# Patient Record
Sex: FEMALE | Race: ASIAN | Employment: OTHER | URBAN - METROPOLITAN AREA
[De-identification: names, ages, dates, MRNs, and addresses within clinical notes are randomized per-mention and may not be internally consistent; named-entity substitution may affect disease eponyms.]

---

## 2017-02-08 ENCOUNTER — NURSING HOME VISIT (OUTPATIENT)
Dept: GERIATRICS | Facility: CLINIC | Age: 82
End: 2017-02-08

## 2017-02-08 ENCOUNTER — TRANSFERRED RECORDS (OUTPATIENT)
Dept: HEALTH INFORMATION MANAGEMENT | Facility: CLINIC | Age: 82
End: 2017-02-08

## 2017-02-08 VITALS
TEMPERATURE: 97.3 F | SYSTOLIC BLOOD PRESSURE: 133 MMHG | OXYGEN SATURATION: 92 % | BODY MASS INDEX: 19.56 KG/M2 | WEIGHT: 103.6 LBS | DIASTOLIC BLOOD PRESSURE: 68 MMHG | RESPIRATION RATE: 17 BRPM | HEART RATE: 61 BPM | HEIGHT: 61 IN

## 2017-02-08 DIAGNOSIS — I10 ESSENTIAL HYPERTENSION, BENIGN: ICD-10-CM

## 2017-02-08 DIAGNOSIS — I69.959 HEMIPLEGIA, LATE EFFECT OF CEREBROVASCULAR DISEASE (H): Primary | ICD-10-CM

## 2017-02-08 DIAGNOSIS — G40.909 SEIZURE DISORDER (H): ICD-10-CM

## 2017-02-08 DIAGNOSIS — R13.10 DYSPHAGIA, UNSPECIFIED TYPE: ICD-10-CM

## 2017-02-08 DIAGNOSIS — F01.52: ICD-10-CM

## 2017-02-08 LAB
BUN SERPL-MCNC: 21 MG/DL (ref 7–24)
CREAT SERPL-MCNC: 0.7 MG/DL (ref 0.55–1.02)
GFR SERPL CREATININE-BSD FRML MDRD: >60 ML/MIN/1.73M2
HEMOGLOBIN: 13.1 G/DL (ref 12–16)
POTASSIUM SERPL-SCNC: 4.1 MMOL/L (ref 3.5–5.1)
SODIUM SERPL-SCNC: 143 MMOL/L (ref 136–145)

## 2017-02-08 NOTE — PROGRESS NOTES
[facility]    Chief Complaint   Patient presents with     [facility] Regulatory       HPI:    Riaz Sim is a 89 year old  (3/11/1925), who is being seen today for a federally mandated E/M visit at [facility]. Today's concerns are:    Hemiplegia, late effect of cerebrovascular disease (H)  Dysphagia, unspecified type  Seizure disorder (H)  Patient with hx CVA, has hemiplegia and dysphagia. She is tube fed. Does occasionally have 100 cc plus residuals. Dietary reviewed orders and as of yesterday recommending Glucerna 1.2 clementine at 45 ml/hr continuous feeding on at 0330 off at 1830. Thus far no reports of emesis or large residuals.   WEIGHT:  1/4/17 103.4 LBS AND 2/7/17 103.6 LBS. She is off seizure medications and has had no seizure events reported    Essential hypertension, benign  BP: 130-133/68-71 and diet controlled. POTASSIUM      4.5   8/10/2016 and takes KCL daily. Will re-check K level and stop KCL if normal.     Dementia, vascular, with delusions (H)  Chronic, on ASA and scheduled/PRN Seroquel due to delusions troublesome/upsetting to patient per family report. May attempt decrease in Seroquel to 12.5 mg PO once daily and twice daily PRN if OK with family.       ALLERGIES: Review of patient's allergies indicates no known allergies.  PAST MEDICAL HISTORY:  has a past medical history of Essential hypertension, benign; CVA (cerebral infarction); Cerebral embolism with cerebral infarction (H) (7/7/2011); Nosebleeds (7/25/2011); Unspecified cerebral artery occlusion with cerebral infarction; Diabetes mellitus (H); Seizures (H); Hemiplegia (H); Dysphagia; Osteoporosis, senile; Pernicious anemia; Insomnia; Encephalopathy; G tube feedings (H); and Dementia.  PAST SURGICAL HISTORY:  has past surgical history that includes NONSPECIFIC PROCEDURE.  FAMILY HISTORY: family history is not on file.  SOCIAL HISTORY:  reports that she has never smoked. She does not have any smokeless  "tobacco history on file. She reports that she does not drink alcohol or use illicit drugs.    MEDICATIONS:  Current Outpatient Prescriptions   Medication Sig Dispense Refill     sennosides (SENOKOT) 8.6 MG tablet 2 tablets by Per G Tube route At Bedtime       potassium chloride 10 mEq in 50 mL intermittent infusion 10 mEq by Per G Tube route daily       Zinc oxide 10 % CREA Apply topically 2 times daily And PRN       polyvinyl alcohol (ARTIFICIAL TEARS) 1.4 % ophthalmic solution Place 2 drops into the right eye as needed for dry eyes       guaiFENesin (ROBITUSSIN) 100 MG/5ML SOLN 10 mLs by Per G Tube route every 4 hours as needed for cough       QUEtiapine Fumarate (SEROQUEL PO) 12.5 mg by Per G Tube route 2 times daily And every 6 hours prn       ASPIRIN PO 81 mg by Per G Tube route daily       cyanocobalamin 1000 MCG/ML injection Inject 1 mL as directed every 30 days.       acetaminophen (TYLENOL) 160 MG/5ML oral liquid 650 mg by Per G Tube route every 4 hours as needed. Give 20.31 ml (650mg)        SALINE NASAL SPRAY NA [place] 2 sprays into both nostrils as needed        Medications reviewed:  Medications reconciled to facility chart and changes were made to reflect current medications as identified as above med list. Below are the changes that were made:   Medications stopped since last EPIC medication reconciliation:   There are no discontinued medications.    Medications started since last [facility] medication reconciliation:  No orders of the defined types were placed in this encounter.     Case Management:  I have reviewed the care plan and MDS and do agree with the plan. Patient's desire to return to the community is not assessible due to cognitive impairment.  Information reviewed:  Medications, vital signs, orders, and nursing notes.    ROS:  Unobtainable secondary to cognitive impairment or aphasia.    Exam:  /68 mmHg  Pulse 61  Temp(Src) 97.3  F (36.3  C)  Resp 17  Ht 5' 0.5\" (1.537 m)  Wt 103 lb " 9.6 oz (46.993 kg)  BMI 19.89 kg/m2  SpO2 92%  GENERAL APPEARANCE:  in no distress, cooperative. Laying comfortably in bed. Sleepy.   EYES:  Symmetric, no drainage. Sclera white and conjunctiva clear. PERRL.   RESP:  Respirations non-labored, no respiratory distress. Lungs clear to auscultation bilaterally. On room air  CV:  regular rate and rhythm, no murmur, rub, or gallop, no edema to bilateral LE. No open areas on extremities.   ABDOMEN:  bowel sounds present x4. Abdomen soft, nontender, no guarding or rebound. No distension, G-tube in place.   M/S:   Very limited ROM to LUE. ROM to RUE at baseline. LLE ROM limited. RLE ROM at baseline.   NEURO:   Patient alert and calm. L-sided hemiplegia at baseline. Examination of sensation by touch normal to extremities.   PSYCH:  Appeared cooperative; calm.     Lab/Diagnostic data:    CBC RESULTS:   Recent Labs   Lab Test 08/03/16 02/03/16  07/18/14  10/25/12   0745   WBC   --    --    --   5.1  6.3   RBC   --    --    --   4.19  4.16   HGB  12.3  12.6   < >  13.3  13.3   HCT   --    --    --   40.5  39.6   MCV   --    --    --   97  95   MCH   --    --    --   32  32.0   MCHC   --    --    --   33  33.6   RDW   --    --    --   12.6  12.7   PLT   --    --    --   237  205    < > = values in this interval not displayed.       Last Basic Metabolic Panel:  Recent Labs   Lab Test 08/10/16 08/03/16 02/03/16  07/18/14  10/25/12   0745  06/10/11   0915   NA   --   140  141   < >  143  143  140   POTASSIUM  4.5  3.4*  4.2   < >  3.5  4.2  3.3*   CHLORIDE   --    --    --    --   107  104  103   CAROLYN   --    --    --    --   9.1  9.3  8.5   CO2   --    --    --    --    --   25  25   BUN   --   22  21   < >  22  23  14   CR   --   0.76  0.75   < >  0.74  0.68  0.66   GLC   --    --    --    --   87  97  96    < > = values in this interval not displayed.       ASSESSMENT/PLAN  Hemiplegia, late effect of cerebrovascular disease (H)  Chronic. Monitor. Appropriate for LTC. Staff to  anticipate needs.     Dysphagia, unspecified type  Chronic. TF orders as above. Monitor.     Seizure disorder (H)  By history. Now off meds - no recent seizure episodes. Monitor.     Essential hypertension, benign  Chronic, diet controlled. Stop KCL - f/u with K level in two weeks. VS per facility routine.     Dementia, vascular, with delusions (H)  If OK with family will taper Seroquel scheduled dose.     Orders:  1. Stop KCL. Check K level in two weeks diagnosis hypokalemia  2. If OK with family, decrease Seroquel to 12.5 mg PO HS and BID PRN delusions, agitation. Staff to update provider if not effective.     Total time spent with patient visit was 25 min including patient visit and review of past records.Greater than 50% of total time spent with counseling and coordinating care.    Electronically signed by:  RADHA Marquez CNP

## 2017-02-22 ENCOUNTER — TRANSFERRED RECORDS (OUTPATIENT)
Dept: HEALTH INFORMATION MANAGEMENT | Facility: CLINIC | Age: 82
End: 2017-02-22

## 2017-02-22 LAB — POTASSIUM SERPL-SCNC: 3.8 MMOL/L (ref 3.5–5.1)

## 2017-04-17 VITALS
OXYGEN SATURATION: 98 % | RESPIRATION RATE: 16 BRPM | DIASTOLIC BLOOD PRESSURE: 66 MMHG | WEIGHT: 98 LBS | SYSTOLIC BLOOD PRESSURE: 130 MMHG | HEART RATE: 66 BPM | HEIGHT: 61 IN | BODY MASS INDEX: 18.5 KG/M2 | TEMPERATURE: 97.5 F

## 2017-04-18 ENCOUNTER — NURSING HOME VISIT (OUTPATIENT)
Dept: GERIATRICS | Facility: CLINIC | Age: 82
End: 2017-04-18
Payer: COMMERCIAL

## 2017-04-18 DIAGNOSIS — I69.959 HEMIPLEGIA, LATE EFFECT OF CEREBROVASCULAR DISEASE (H): ICD-10-CM

## 2017-04-18 DIAGNOSIS — F01.52: ICD-10-CM

## 2017-04-18 DIAGNOSIS — R13.10 DYSPHAGIA, UNSPECIFIED TYPE: ICD-10-CM

## 2017-04-18 DIAGNOSIS — K59.01 SLOW TRANSIT CONSTIPATION: Primary | ICD-10-CM

## 2017-04-18 PROCEDURE — 99309 SBSQ NF CARE MODERATE MDM 30: CPT | Performed by: INTERNAL MEDICINE

## 2017-04-18 PROCEDURE — 99207 ZZC CDG-CORRECTLY CODED, REVIEWED AND AGREE: CPT | Performed by: INTERNAL MEDICINE

## 2017-04-18 NOTE — PROGRESS NOTES
[facility]  Nursing Home Regulatory Visit  April 18, 2017    Chief Complaint   Patient presents with     [facility] Regulatory       HPI:    Riaz Sim is a 89 year old  (3/11/1925), who is being seen today for a federally mandated E/M visit at [facility]. Today's concerns are:    1) CVA w/ Dysphagia & Hemiplegia -- is maintained on tube feeds. Weights stable. Nutrition follows. Is on ASA 81 mg daily.   2) Vascular Dementia with Delusions -- Chronic. Progressive. Requires 24/7 cares. Delusions managed with quetiapine. She has had a dose reduction since I last saw her in December, now on 12.5 mg daily and q12h PRN (from 12.5 mg BID and q6h PRN).   3) Slow Transit Constipation -- managed with Senna 2 tabs qhs    ALLERGIES: Review of patient's allergies indicates no known allergies.    PAST MEDICAL HISTORY:   Past Medical History:   Diagnosis Date     Cerebral embolism with cerebral infarction (H) 7/7/2011     CVA (cerebral infarction)     R side     Dementia      Diabetes mellitus (H)      Dysphagia      Encephalopathy      Essential hypertension, benign      G tube feedings (H)      Hemiplegia (H)     with left sided neglect     Insomnia      Nosebleeds 7/25/2011     Osteoporosis, senile      Pernicious anemia      Seizures (H)      Unspecified cerebral artery occlusion with cerebral infarction        PAST SURGICAL HISTORY:   Past Surgical History:   Procedure Laterality Date     C NONSPECIFIC PROCEDURE      left wrist surgery       FAMILY HISTORY:   No family history on file.    SOCIAL HISTORY:   Lives in a SNF    MEDICATIONS:  Current Outpatient Prescriptions   Medication Sig Dispense Refill     sennosides (SENOKOT) 8.6 MG tablet 2 tablets by Per G Tube route At Bedtime       Zinc oxide 10 % CREA Apply topically 2 times daily And PRN       polyvinyl alcohol (ARTIFICIAL TEARS) 1.4 % ophthalmic solution Place 2 drops into the right eye as needed for dry eyes       guaiFENesin  "(ROBITUSSIN) 100 MG/5ML SOLN 10 mLs by Per G Tube route every 4 hours as needed for cough       QUEtiapine Fumarate (SEROQUEL PO) 12.5 mg by Per G Tube route daily And every 12 hours prn       ASPIRIN PO 81 mg by Per G Tube route daily       cyanocobalamin 1000 MCG/ML injection Inject 1 mL as directed every 30 days.       acetaminophen (TYLENOL) 160 MG/5ML oral liquid 650 mg by Per G Tube route every 4 hours as needed. Give 20.31 ml (650mg)        SALINE NASAL SPRAY NA [place] 2 sprays into both nostrils as needed          Medications reviewed:  Medications reconciled to facility chart and changes were made to reflect current medications as identified as above med list. Below are the changes that were made:   Medications stopped since last EPIC medication reconciliation:   There are no discontinued medications.  Medications started since last [facility] medication reconciliation:  No orders of the defined types were placed in this encounter.    Case Management:  I have reviewed the care plan and MDS and do agree with the plan.   Information reviewed:  Medications, vital signs, orders, and nursing notes.    ROS:  Unable to be obtained due to cognitive impairment or aphasia.     PHYSICAL EXAM:  /66  Pulse 66  Temp 97.5  F (36.4  C)  Resp 16  Ht 5' 0.5\" (1.537 m)  Wt 98 lb (44.5 kg)  SpO2 98%  BMI 18.82 kg/m2  Gen: laying in bed in no acute distress  Resp: breathing non-labored  GI: abdomen soft  Ext: no LE edema  Psych: memory, judgement and insight impaired    Lab/Diagnostic data:  Reviewed as per Epic    ASSESSMENT/PLAN    1) CVA w/ Dysphagia & Hemiplegia   Is maintained on tube feeds. Weights stable. Nutrition follows.   -- continues on ASA 81 mg daily    2) Vascular Dementia with Delusions   Chronic. Progressive. Delusions managed with quetiapine. She has had a dose reduction since I last saw her in December, now on 12.5 mg daily and q12h PRN (from 12.5 mg BID and q6h PRN).   -- continues on quetiapine 12.5 " mg daily and q12h PRN  -- ongoing 24/7 nursing and supportive cares    3) Slow Transit Constipation   -- managed with Senna 2 tabs qhs  -- adjust bowel regimen as needed    Riaz Sim is stable. No concerns per nursing. No changes to plan of care today.    Electronically signed by:  Candice Baron MD

## 2017-05-15 ENCOUNTER — NURSING HOME VISIT (OUTPATIENT)
Dept: GERIATRICS | Facility: CLINIC | Age: 82
End: 2017-05-15
Payer: COMMERCIAL

## 2017-05-15 VITALS
OXYGEN SATURATION: 97 % | HEIGHT: 61 IN | TEMPERATURE: 97 F | SYSTOLIC BLOOD PRESSURE: 121 MMHG | BODY MASS INDEX: 18.81 KG/M2 | WEIGHT: 99.6 LBS | RESPIRATION RATE: 16 BRPM | HEART RATE: 61 BPM | DIASTOLIC BLOOD PRESSURE: 68 MMHG

## 2017-05-15 DIAGNOSIS — I69.959 HEMIPLEGIA, LATE EFFECT OF CEREBROVASCULAR DISEASE (H): ICD-10-CM

## 2017-05-15 DIAGNOSIS — Z00.00 VISIT FOR ANNUAL HEALTH EXAMINATION: Primary | ICD-10-CM

## 2017-05-15 DIAGNOSIS — G47.00 INSOMNIA, UNSPECIFIED TYPE: ICD-10-CM

## 2017-05-15 DIAGNOSIS — K59.01 SLOW TRANSIT CONSTIPATION: ICD-10-CM

## 2017-05-15 DIAGNOSIS — I10 ESSENTIAL HYPERTENSION, BENIGN: ICD-10-CM

## 2017-05-15 DIAGNOSIS — R13.10 DYSPHAGIA, UNSPECIFIED TYPE: ICD-10-CM

## 2017-05-15 DIAGNOSIS — F01.52: ICD-10-CM

## 2017-05-15 DIAGNOSIS — D51.0 PA (PERNICIOUS ANEMIA): ICD-10-CM

## 2017-05-15 DIAGNOSIS — G40.909 SEIZURE DISORDER (H): ICD-10-CM

## 2017-05-15 DIAGNOSIS — Z71.89 ADVANCED DIRECTIVES, COUNSELING/DISCUSSION: Chronic | ICD-10-CM

## 2017-05-15 PROCEDURE — 99207 ZZC CDG-CORRECTLY CODED, REVIEWED AND AGREE: CPT | Performed by: NURSE PRACTITIONER

## 2017-05-15 PROCEDURE — 99318 ZZC ANNUAL NURSING FAC ASSESSMNT, STABLE: CPT | Performed by: NURSE PRACTITIONER

## 2017-05-15 NOTE — PROGRESS NOTES
[facility]  Chief Complaint   Patient presents with     Annual Comprehensive Nursing Home       HPI:      Riaz Sim is a 89 year old  (3/11/1925), who is being seen today for an annual comprehensive visit at [facility] . Today's concerns are:    Visit for annual health examination   Completed today, 5/15/17.    Hemiplegia, late effect of cerebrovascular disease (H)  Nonverbal today but following all commands. Per staff, she speaks in her language frequently, other times, very calm without speaking. Left sided deficits. She is dependant with all cares. Uses a lift for transfers and wheelchair for mobility.     Dysphagia, unspecified type  She is NPO and receives cyclic tube feeds [id number], with 150 ml QID water flushes. Residuals of 50 ml. No nausea or emesis. WEIGHT: 4/19/17 100.2 LBS AND 5/3/17 99.6 LBS    Seizure disorder (H)  By history with no reports of seizures. Currently does not take antieleptic medications.    Dementia, vascular, with delusions (H)  Insomnia, unspecified type  No behaviors noted by staff. She will have 1-2 nights of difficulty sleeping but generally insomnia has been well controlled. Continues on Quetiapine 12.5 mg daily and q12hrs PRN.    Essential hypertension, benign  By history. Based on JNC-8 goals,  patients age of 89 year old, no presence of diabetes or CKD, and goals of care goal BP is <150/90 mm Hg. patient is stable and continue without pharmacological invention with routine assessment.  BP: 114-130/65-72  P: 61-81  O2: 94-97 on room air.    Pernicious Anemia  Receives a monthly B12 injection.   Hemoglobin   Date Value Ref Range Status   02/08/2017 13.1 12.0 - 16.0 g/dL Final       Slow transit constipation  Regular bowel movement with 2 tablets senokot at HS. No complaints of abdominal pain, discomfort or bloating.     Advance Care Planning  Goal of care is comfort. POLST form reviewed with family.     Depression screen cannot be completed given the  above diagnoses. Patient is stable without any signs of depression and no futher interventions warrented at this time.    PHQ-9 SCORE 5/15/2017   Total Score 0         ALLERGIES: Review of patient's allergies indicates no known allergies.  PROBLEM LIST:  Patient Active Problem List   Diagnosis     Essential hypertension, benign     Hemiplegia, late effect of cerebrovascular disease (H)     Dysphagia     Insomnia     Senile osteoporosis     Pernicious anemia     Encounter for counseling     Dementia, vascular, with delusions (H)     Advance Care Planning     Slow transit constipation, attempts to dig our BM, better with supps     Visit for annual health examination 6/3/15     Seizure disorder (H)     PAST MEDICAL HISTORY:  has a past medical history of Cerebral embolism with cerebral infarction (H) (7/7/2011); CVA (cerebral infarction); Dementia; Diabetes mellitus (H); Dysphagia; Encephalopathy; Essential hypertension, benign; G tube feedings (H); Hemiplegia (H); Insomnia; Nosebleeds (7/25/2011); Osteoporosis, senile; Pernicious anemia; Seizures (H); and Unspecified cerebral artery occlusion with cerebral infarction.  PAST SURGICAL HISTORY:  has a past surgical history that includes NONSPECIFIC PROCEDURE.  FAMILY HISTORY: family history is not on file.  SOCIAL HISTORY:  reports that she has never smoked. She does not have any smokeless tobacco history on file. She reports that she does not drink alcohol or use illicit drugs.  IMMUNIZATIONS:  Most Recent Immunizations   Administered Date(s) Administered     Influenza (High Dose) 3 valent vaccine 10/07/2016     Influenza (IIV3) 11/05/2015     Pneumococcal (PCV 13) 11/10/2015     Pneumococcal 23 valent 06/09/2011     Tdap (Adacel,Boostrix) 06/27/2014     Above immunizations pulled from [facility]. [facility] and facility records also reconciled. Outstanding information sent to  to update [facility].  Future immunizations are not needed at this point as  all recommended immunizations are up to date.   MEDICATIONS:  Current Outpatient Prescriptions   Medication Sig Dispense Refill     sennosides (SENOKOT) 8.6 MG tablet 2 tablets by Per G Tube route At Bedtime       Zinc oxide 10 % CREA Apply topically 2 times daily And PRN       polyvinyl alcohol (ARTIFICIAL TEARS) 1.4 % ophthalmic solution Place 2 drops into the right eye as needed for dry eyes       guaiFENesin (ROBITUSSIN) 100 MG/5ML SOLN 10 mLs by Per G Tube route every 4 hours as needed for cough       QUEtiapine Fumarate (SEROQUEL PO) 12.5 mg by Per G Tube route daily And every 12 hours prn       ASPIRIN PO 81 mg by Per G Tube route daily       cyanocobalamin 1000 MCG/ML injection Inject 1 mL as directed every 30 days.       acetaminophen (TYLENOL) 160 MG/5ML oral liquid 650 mg by Per G Tube route every 4 hours as needed. Give 20.31 ml (650mg)        SALINE NASAL SPRAY NA [place] 2 sprays into both nostrils as needed        Medications reviewed:  Medications reconciled to facility chart and changes were made to reflect current medications as identified as above med list. Below are the changes that were made:   Medications stopped since last EPIC medication reconciliation:   There are no discontinued medications.    Medications started since last [facility] medication reconciliation:  No orders of the defined types were placed in this encounter.      Case Management:  I have reviewed the facility/SNF care plan/MDS which was done 4/18/17, including the falls risk, nutrition and pain screening. I also reviewed the current immunizations, and preventive care..Future cancer screening is not clinically indicated secondary to age/goals of care Patient's desire to return to the community is not assessible due to cognitive impairment. Current Level of Care is appropriate.    Advance Directive Discussion:    I reviewed the current advanced directives as reflected in EPIC, the POLST and the facility chart, and verified the  "congruency of orders. I contacted the first party , Jacqueline, and left a message regarding the plan of Care.  I did not due to cognitive impairment review the advance directives with the resident.     Team Discussion:  I communicated with the appropriate disciplines involved with the Plan of Care:   Nursing    Patient Goal:  Patient's/family goal is pain control and comfort.    Information reviewed:  Medications, vital signs, orders, and nursing notes.    ROS:  Unobtainable secondary to cognitive impairment or aphasia, but today pt nods \"no\" to pain, shortness of breath or chest pain.     Exam:  /68  Pulse 61  Temp 97  F (36.1  C)  Resp 16  Ht 5' 0.5\" (1.537 m)  Wt 99 lb 9.6 oz (45.2 kg)  SpO2 97%  BMI 19.13 kg/m2   GENERAL APPEARANCE: in no distress, cooperative. Laying comfortably in bed. Eyes closed.   EYES: Symmetric, no drainage.    NECK: Supple, trachea midline. No asymmetry  RESP: Respirations non-labored, no respiratory distress. Lungs clear to auscultation bilaterally.   CV: regular rate and rhythm, no murmur, rub, or gallop, no edema to bilateral LE, +2 pedal pulses  ABDOMEN: bowel sounds present x4. Abdomen soft and nontender, no guarding or rebound. G-tube in place. Site free of s/s of infection.   M/S: Very limited ROM to LUE, able to squeeze with bilateral hands (weaker  on L).  LLE ROM limited and strength to gravity only. RLE and RUE range of motion present.   SKIN: Inspection of skin and subcutaneous tissue: intact, no rashes identified.  NEURO: No facial asymmetry. L-sided hemiplegia at baseline. Examination of sensation by touch normal to extremities.   PSYCH:calm and cooperative. Insight and judgement impaired, memory impaired.       Lab/Diagnostic data:      Hemoglobin   Date Value Ref Range Status   02/08/2017 13.1 12.0 - 16.0 g/dL Final       Last Basic Metabolic Panel:  Recent Labs   Lab Test 02/22/17 02/08/17  08/03/16  07/18/14  10/25/12   0745  06/10/11   0915   NA   --   " 143   --   140   < >  143  143  140   POTASSIUM  3.8  4.1   < >  3.4*   < >  3.5  4.2  3.3*   CHLORIDE   --    --    --    --    --   107  104  103   CAROLYN   --    --    --    --    --   9.1  9.3  8.5   CO2   --    --    --    --    --    --   25  25   BUN   --   21   --   22   < >  22  23  14   CR   --   0.70   --   0.76   < >  0.74  0.68  0.66   GLC   --    --    --    --    --   87  97  96    < > = values in this interval not displayed.       ASSESSMENT/PLAN  (Z00.00) Visit for annual health examination 5/15/2017  (primary encounter diagnosis)    (I69.959) Hemiplegia, late effect of cerebrovascular disease (H)  (R13.10) Dysphagia, unspecified type  Comment: Chronic secondary to CVA. Left sided hemiplegia.  Plan: continue with cyclic tube feeds. 24/7 nursing cares    (G40.909) Seizure disorder (H)  Comment: by history-no further seizure activity   Plan: continue to monitor    (F01.51,  F22) Dementia, vascular, with delusions (H)  Comment: chronic, progressive. Weight stable.   Plan: 24/7 nursing care.    (G47.00) Insomnia, unspecified type  Comment: chronic, controlled secondary to dementia.  Plan: continue seroquel. Monitor.    (I10) Essential hypertension, benign  Comment: chronic, controlled. No medications as this time.  Plan: continue to monitor blood pressure.    (D51.0) PA (pernicious anemia)  Comment: chronic, hgb stable.   Plan: continue B12 injections and monitor level annually.    (K59.01) Slow transit constipation  Comment: chronic, controlled.   Plan: continue with senokot at HS.     (Z71.89) Advance Care Planning  Comment: comfort care.  .  Electronically signed by:  RADHA Navarro CNP

## 2017-05-16 ASSESSMENT — PATIENT HEALTH QUESTIONNAIRE - PHQ9: SUM OF ALL RESPONSES TO PHQ QUESTIONS 1-9: 0

## 2017-06-27 VITALS
SYSTOLIC BLOOD PRESSURE: 135 MMHG | RESPIRATION RATE: 16 BRPM | TEMPERATURE: 97 F | DIASTOLIC BLOOD PRESSURE: 76 MMHG | HEART RATE: 64 BPM | OXYGEN SATURATION: 97 %

## 2017-06-28 ENCOUNTER — NURSING HOME VISIT (OUTPATIENT)
Dept: GERIATRICS | Facility: CLINIC | Age: 82
End: 2017-06-28
Payer: COMMERCIAL

## 2017-06-28 DIAGNOSIS — I10 ESSENTIAL HYPERTENSION, BENIGN: ICD-10-CM

## 2017-06-28 DIAGNOSIS — F01.52: ICD-10-CM

## 2017-06-28 DIAGNOSIS — G40.909 SEIZURE DISORDER (H): Primary | ICD-10-CM

## 2017-06-28 DIAGNOSIS — K59.01 SLOW TRANSIT CONSTIPATION: ICD-10-CM

## 2017-06-28 DIAGNOSIS — I69.959 HEMIPLEGIA, LATE EFFECT OF CEREBROVASCULAR DISEASE (H): ICD-10-CM

## 2017-06-28 DIAGNOSIS — R13.10 DYSPHAGIA, UNSPECIFIED TYPE: ICD-10-CM

## 2017-06-28 PROCEDURE — 99309 SBSQ NF CARE MODERATE MDM 30: CPT | Performed by: NURSE PRACTITIONER

## 2017-06-28 NOTE — PROGRESS NOTES
[facility]    Chief Complaint   Patient presents with     alf Regulatory       HPI:    Riaz Sim is a 89 year old  (3/11/1925), who is being seen today for a federally mandated E/M visit at [facility] .  HPI information obtained from: facility chart records, facility staff and [facility] Epic chart review.   Today's concerns are:    Seizure disorder (H)  Patient without any recent noted seizure events. She is now off seizure meds. Monitored by staff.     Hemiplegia, late effect of cerebrovascular disease (H)  Dementia, vascular, with delusions (H)  Dysphagia, unspecified type  Patient with significant dementia. She only speaks cantonese and per previous family report she is confused and does not benefit from . Has been on scheduled low dose Seroquel at PM due to family request as she often is delusional and reports seeing ghosts. Decreased dose several months ago without issues.     Slow transit constipation, attempts to dig our BM, better with supps  Managed well with Senna, no changes needed.     Essential hypertension, benign  Chronic. Recent Weight: 05/17: 98.4 lbs - 06/14: 99.8 lbs and BP: 124-135/64-76 mmHg. Diet controlled, off meds.       ALLERGIES: Review of patient's allergies indicates no known allergies.  PAST MEDICAL HISTORY:  has a past medical history of Cerebral embolism with cerebral infarction (H) (7/7/2011); CVA (cerebral infarction); Dementia; Diabetes mellitus (H); Dysphagia; Encephalopathy; Essential hypertension, benign; G tube feedings (H); Hemiplegia (H); Insomnia; Nosebleeds (7/25/2011); Osteoporosis, senile; Pernicious anemia; Seizures (H); and Unspecified cerebral artery occlusion with cerebral infarction.  PAST SURGICAL HISTORY:  has a past surgical history that includes NONSPECIFIC PROCEDURE.  FAMILY HISTORY: family history is not on file.  SOCIAL HISTORY:  reports that she has never smoked. She does not have any smokeless tobacco history  on file. She reports that she does not drink alcohol or use illicit drugs.    MEDICATIONS:  Current Outpatient Prescriptions   Medication Sig Dispense Refill     sennosides (SENOKOT) 8.6 MG tablet 2 tablets by Per G Tube route At Bedtime       Zinc oxide 10 % CREA Apply topically 2 times daily And PRN       polyvinyl alcohol (ARTIFICIAL TEARS) 1.4 % ophthalmic solution Place 2 drops into the right eye as needed for dry eyes       guaiFENesin (ROBITUSSIN) 100 MG/5ML SOLN 10 mLs by Per G Tube route every 4 hours as needed for cough       QUEtiapine Fumarate (SEROQUEL PO) 12.5 mg by Per G Tube route daily And once daily prn       ASPIRIN PO 81 mg by Per G Tube route daily       cyanocobalamin 1000 MCG/ML injection Inject 1 mL as directed every 30 days.       acetaminophen (TYLENOL) 160 MG/5ML oral liquid 650 mg by Per G Tube route every 4 hours as needed. Give 20.31 ml (650mg)        SALINE NASAL SPRAY NA [place] 2 sprays into both nostrils as needed        Medications reviewed:  Medications reconciled to facility chart and changes were made to reflect current medications as identified as above med list. Below are the changes that were made:   Medications stopped since last EPIC medication reconciliation:   There are no discontinued medications.    Medications started since last [facility] medication reconciliation:  No orders of the defined types were placed in this encounter.    Case Management:  I have reviewed the care plan and MDS and do agree with the plan. Patient's desire to return to the community is not assessible due to cognitive impairment.  Information reviewed:  Medications, vital signs, orders, and nursing notes.    ROS:  Unobtainable secondary to cognitive impairment or aphasia.    Exam:  Vitals: /76  Pulse 64  Temp 97  F (36.1  C)  Resp 16  SpO2 97%  BMI= There is no height or weight on file to calculate BMI.  GENERAL APPEARANCE:  in no distress, cooperative. Laying comfortably in bed. Allows exam.    EYES:  Symmetric, no drainage. Sclera white and conjunctiva clear. PERRL.   RESP:  Respirations non-labored, no respiratory distress. Lungs clear to auscultation bilaterally. On room air.   CV:  regular rate and rhythm, no murmur, rub, or gallop, no edema to bilateral LE. No open areas on extremities.   ABDOMEN:  bowel sounds present x4. Abdomen soft, nontender, no guarding or rebound. No distension, G-tube in place.   M/S:   Very limited ROM to LUE and LLE. ROM to RUE and RLE at baseline.  NEURO:   Patient alert and calm. L-sided hemiplegia at baseline. Examination of sensation by touch appears normal.  PSYCH:  Appeared cooperative; calm.     Lab/Diagnostic data:    CBC RESULTS:   Recent Labs   Lab Test 02/08/17 08/03/16  07/18/14  10/25/12   0745   WBC   --    --    --   5.1  6.3   RBC   --    --    --   4.19  4.16   HGB  13.1  12.3   < >  13.3  13.3   HCT   --    --    --   40.5  39.6   MCV   --    --    --   97  95   MCH   --    --    --   32  32.0   MCHC   --    --    --   33  33.6   RDW   --    --    --   12.6  12.7   PLT   --    --    --   237  205    < > = values in this interval not displayed.       Last Basic Metabolic Panel:  Recent Labs   Lab Test 02/22/17 02/08/17  08/03/16  07/18/14  10/25/12   0745  06/10/11   0915   NA   --   143   --   140   < >  143  143  140   POTASSIUM  3.8  4.1   < >  3.4*   < >  3.5  4.2  3.3*   CHLORIDE   --    --    --    --    --   107  104  103   CAROLYN   --    --    --    --    --   9.1  9.3  8.5   CO2   --    --    --    --    --    --   25  25   BUN   --   21   --   22   < >  22  23  14   CR   --   0.70   --   0.76   < >  0.74  0.68  0.66   GLC   --    --    --    --    --   87  97  96    < > = values in this interval not displayed.       ASSESSMENT/PLAN    ICD-10-CM    1. Seizure disorder (H) G40.909 By history. No recent events.   2. Hemiplegia, late effect of cerebrovascular disease (H) I69.959 Chronic. Requires total care.   3. Dementia, vascular, with delusions (H)  F01.51 Chronic, has done well since most recent Seroquel dose.    F22    4. Dysphagia, unspecified type R13.10 Chronic. Tube fed. No issues.    5. Slow transit constipation, attempts to dig our BM, better with supps K59.01 Chronic well managed with Senna. Continue same.   6. Essential hypertension, benign I10 Diet control. Stable. VS per routine.      Orders:  Decrease Seroquel to 12.5 mg per GT every PM and daily PRN.     Total time spent with patient visit was 25 min including patient visit, review of past records and discussion of patient status and POC with staff. Greater than 50% of total time spent with counseling and coordinating care due to health issues as above.      Electronically signed by:  RADHA Marquez CNP

## 2017-06-28 NOTE — MR AVS SNAPSHOT
"              After Visit Summary   6/28/2017    Riaz Sim    MRN: [id number]           Patient Information     Date Of Birth          3/11/1925        Visit Information        Provider Department      6/28/2017 12:30 PM Kyung Lr APRN CNP [facility]        Today's Diagnoses     Seizure disorder (H)    -  1    Hemiplegia, late effect of cerebrovascular disease (H)        Dementia, vascular, with delusions (H)        Dysphagia, unspecified type        Slow transit constipation, attempts to dig our BM, better with supps        Essential hypertension, benign           Follow-ups after your visit        Who to contact     If you have questions or need follow up information about today's clinic visit or your schedule please contact [facility] directly at [phone].  Normal or non-critical lab and imaging results will be communicated to you by [email or web address], letter or phone within 4 business days after the clinic has received the results. If you do not hear from us within 7 days, please contact the clinic through [email or web address] or phone. If you have a critical or abnormal lab result, we will notify you by phone as soon as possible.  Submit refill requests through Triventus or call your pharmacy and they will forward the refill request to us. Please allow 3 business days for your refill to be completed.          Additional Information About Your Visit        [email or web address] Information     T[employer]entus lets you send messages to your doctor, view your test results, renew your prescriptions, schedule appointments and more. To sign up, go to [email or web address] . Click on \"Log in\" on the left side of the screen, which will take you to the Welcome page. Then click on \"Sign up Now\" on the right side of the page.     You will be asked to enter the access code listed below, as well as some personal information. Please follow the directions to create your username and password.     Your access code is: " [email or web address]  Expires: 2017  1:02 PM     Your access code will  in 90 days. If you need help or a new code, please call your [facility] clinic or [phone].        Care EveryWhere ID     This is your Care EveryWhere ID. This could be used by other organizations to access your [facility] medical records  [id number]        Your Vitals Were     Pulse Temperature Respirations Pulse Oximetry          64 97  F (36.1  C) 16 97%         Blood Pressure from Last 3 Encounters:   17 135/76   05/15/17 121/68   17 130/66    Weight from Last 3 Encounters:   05/15/17 99 lb 9.6 oz (45.2 kg)   17 98 lb (44.5 kg)   17 103 lb 9.6 oz (47 kg)              Today, you had the following     No orders found for display       Primary Care Provider Office Phone # Fax #    Eileen Zhao Stefania, APRN [phone]       [facility] [address] [facility] [address]  [facility] [address]        Equal Access to Services     [facility] HospitalBL[facility] : Hadii aad ku hadasho Soomaali, waaxda luqadaha, qaybta kaalmada adeegyada, marlin alvarado haytatan joycelyn staton . So [facility] [phone].    ATENCIÓN: Si habla español, tiene a sadler disposición servicios gratuitos de asistencia lingüística. Llame al [phone].    We comply with applicable federal civil rights laws and [place] laws. We do not discriminate on the basis of race, color, national origin, age, disability sex, sexual orientation or gender identity.            Thank you!     Thank you for choosing [facility]  for your care. Our goal is always to provide you with excellent care. Hearing back from our patients is one way we can continue to improve our services. Please take a few minutes to complete the written survey that you may receive in the mail after your visit with us. Thank you!             Your Updated Medication List - Protect others around you: Learn how to safely use, store and throw away your medicines at  www.disposemymeds.org.          This list is accurate as of: 6/28/17  1:02 PM.  Always use your most recent med list.                   Brand Name Dispense Instructions for use Diagnosis    acetaminophen 32 mg/mL solution    TYLENOL     650 mg by Per G Tube route every 4 hours as needed. Give 20.31 ml (650mg)        ARTIFICIAL TEARS 1.4 % ophthalmic solution   Generic drug:  polyvinyl alcohol      Place 2 drops into the right eye as needed for dry eyes        ASPIRIN PO      81 mg by Per G Tube route daily        cyanocobalamin 1000 MCG/ML injection    VITAMIN B12     Inject 1 mL as directed every 30 days.        guaiFENesin 20 mg/mL Soln solution    ROBITUSSIN     10 mLs by Per G Tube route every 4 hours as needed for cough        SALINE NASAL SPRAY NA      [place] 2 sprays into both nostrils as needed        sennosides 8.6 MG tablet    SENOKOT     2 tablets by Per G Tube route At Bedtime        SEROQUEL PO      12.5 mg by Per G Tube route daily And once daily prn        Zinc oxide 10 % Crea      Apply topically 2 times daily And PRN

## 2017-08-02 ENCOUNTER — TRANSFERRED RECORDS (OUTPATIENT)
Dept: HEALTH INFORMATION MANAGEMENT | Facility: CLINIC | Age: 82
End: 2017-08-02

## 2017-08-02 LAB
BUN SERPL-MCNC: 16 MG/DL (ref 7–24)
CREAT SERPL-MCNC: 0.6 MG/DL (ref 0.55–1.02)
GFR SERPL CREATININE-BSD FRML MDRD: >60 ML/MIN/1.73M2
HEMOGLOBIN: 13 G/DL (ref 12–16)
POTASSIUM SERPL-SCNC: 3.7 MMOL/L (ref 3.5–5.1)
SODIUM SERPL-SCNC: 141 MMOL/L (ref 136–145)

## 2017-08-07 VITALS
DIASTOLIC BLOOD PRESSURE: 76 MMHG | SYSTOLIC BLOOD PRESSURE: 135 MMHG | WEIGHT: 99.8 LBS | OXYGEN SATURATION: 97 % | HEART RATE: 64 BPM | RESPIRATION RATE: 16 BRPM | TEMPERATURE: 97 F | BODY MASS INDEX: 19.17 KG/M2

## 2017-08-08 ENCOUNTER — NURSING HOME VISIT (OUTPATIENT)
Dept: GERIATRICS | Facility: CLINIC | Age: 82
End: 2017-08-08
Payer: COMMERCIAL

## 2017-08-08 DIAGNOSIS — F01.52: Primary | ICD-10-CM

## 2017-08-08 DIAGNOSIS — K59.01 SLOW TRANSIT CONSTIPATION: ICD-10-CM

## 2017-08-08 DIAGNOSIS — R13.10 DYSPHAGIA, UNSPECIFIED TYPE: ICD-10-CM

## 2017-08-08 PROCEDURE — 99309 SBSQ NF CARE MODERATE MDM 30: CPT | Performed by: INTERNAL MEDICINE

## 2017-08-08 PROCEDURE — 99207 ZZC CDG-CORRECTLY CODED, REVIEWED AND AGREE: CPT | Performed by: INTERNAL MEDICINE

## 2017-08-08 NOTE — PROGRESS NOTES
[facility]  Nursing Home Regulatory Visit  August 8, 2017    Chief Complaint   Patient presents with     senior living Regulatory       HPI:    Riaz Sim is a 89 year old  (3/11/1925), who is being seen today for a federally mandated E/M visit at [facility]. Today's concerns are:    1) Vascular Dementia with Delusions -- Chronic. Progressive. Requires 24/7 cares. Delusions managed with quetiapine for which she has had a GDR in December 2016 and June of this year, now on 12.5 mg daily and daily PRN  2) Slow Transit Constipation -- managed with Senna 2 tabs qhs  3) Dysphagia -- Secondary to CVA and presumably also advanced dementia. Receives tube feeds with Glucerna 1.2 clementine @ 45 ml/hr from 0330 to 1830. PEG last replaced in December.     ALLERGIES: Review of patient's allergies indicates no known allergies.    PAST MEDICAL HISTORY:   Past Medical History:   Diagnosis Date     Cerebral embolism with cerebral infarction (H) 7/7/2011     CVA (cerebral infarction)     R side     Dementia      Diabetes mellitus (H)      Dysphagia      Encephalopathy      Essential hypertension, benign      G tube feedings (H)      Hemiplegia (H)     with left sided neglect     Insomnia      Nosebleeds 7/25/2011     Osteoporosis, senile      Pernicious anemia      Seizures (H)      Unspecified cerebral artery occlusion with cerebral infarction        PAST SURGICAL HISTORY:   Past Surgical History:   Procedure Laterality Date     C NONSPECIFIC PROCEDURE      left wrist surgery       FAMILY HISTORY:   No family history on file.    SOCIAL HISTORY:   Lives in a SNF    MEDICATIONS:  Current Outpatient Prescriptions   Medication Sig Dispense Refill     sennosides (SENOKOT) 8.6 MG tablet 2 tablets by Per G Tube route At Bedtime       Zinc oxide 10 % CREA Apply topically 2 times daily And PRN       polyvinyl alcohol (ARTIFICIAL TEARS) 1.4 % ophthalmic solution Place 2 drops into the right eye as needed for dry eyes        guaiFENesin (ROBITUSSIN) 100 MG/5ML SOLN 10 mLs by Per G Tube route every 4 hours as needed for cough       QUEtiapine Fumarate (SEROQUEL PO) 12.5 mg by Per G Tube route daily And once daily prn       ASPIRIN PO 81 mg by Per G Tube route daily       cyanocobalamin 1000 MCG/ML injection Inject 1 mL as directed every 30 days.       acetaminophen (TYLENOL) 160 MG/5ML oral liquid 650 mg by Per G Tube route every 4 hours as needed. Give 20.31 ml (650mg)        SALINE NASAL SPRAY NA [place] 2 sprays into both nostrils as needed          Medications reviewed:  Medications reconciled to facility chart and changes were made to reflect current medications as identified as above med list. Below are the changes that were made:   Medications stopped since last EPIC medication reconciliation:   There are no discontinued medications.  Medications started since last [facility] medication reconciliation:  No orders of the defined types were placed in this encounter.    Case Management:  I have reviewed the care plan and MDS and do agree with the plan.   Information reviewed:  Medications, vital signs, orders, and nursing notes.    ROS:  Unable to be obtained due to cognitive impairment or aphasia.     PHYSICAL EXAM:  /76  Pulse 64  Temp 97  F (36.1  C)  Resp 16  Wt 99 lb 12.8 oz (45.3 kg)  SpO2 97%  BMI 19.17 kg/m2  Gen: laying in bed in no acute distress  Resp: breathing non labored  GI: abdomen soft  Ext: decreased muscle tone; no LE edema  Psych: memory, judgement and insight impaired.     Lab/Diagnostic data:  Reviewed as per Epic    ASSESSMENT/PLAN    1) Vascular Dementia with Delusions   Chronic. Progressive. Delusions managed with quetiapine for which she has had a GDR in December 2016 and June of this year  -- continues on quetiapine 12.5 mg daily and daily PRN  -- ongoing 24/7 nursing and supportive cares    2) Slow Transit Constipation   -- managed with Senna 2 tabs qhs  -- adjust bowel regimen as needed    3)  Dysphagia   Secondary to CVA and presumably also advanced dementia. PEG last replaced in December.   -- continues on tube feeds with Glucerna 1.2 clementine @ 45 ml/hr from 0330 to 1830.     Riaz Sim is stable. No concerns per nursing. No changes to plan of care today.    Electronically signed by:  Candice Baron MD

## 2017-08-30 ENCOUNTER — APPOINTMENT (OUTPATIENT)
Dept: INTERVENTIONAL RADIOLOGY/VASCULAR | Facility: CLINIC | Age: 82
End: 2017-08-30
Attending: NURSE PRACTITIONER
Payer: COMMERCIAL

## 2017-08-30 ENCOUNTER — HOSPITAL ENCOUNTER (OUTPATIENT)
Facility: CLINIC | Age: 82
Discharge: HOME | End: 2017-08-30
Attending: RADIOLOGY | Admitting: RADIOLOGY
Payer: COMMERCIAL

## 2017-08-30 VITALS
RESPIRATION RATE: 16 BRPM | TEMPERATURE: 95.7 F | HEART RATE: 89 BPM | OXYGEN SATURATION: 98 % | DIASTOLIC BLOOD PRESSURE: 72 MMHG | SYSTOLIC BLOOD PRESSURE: 131 MMHG

## 2017-08-30 DIAGNOSIS — Z51.89 TREATMENT: ICD-10-CM

## 2017-08-30 PROCEDURE — 27210915 ZZ H TUBE GASTRO CR4

## 2017-08-30 PROCEDURE — 40000854 ZZH STATISTIC SIMPLE TUBE INSERTION/CHARGE, PORT, CATH, FISTULOGRAM

## 2017-08-30 PROCEDURE — 27210905 ZZH KIT CR7

## 2017-08-30 PROCEDURE — 49450 REPLACE G/C TUBE PERC: CPT

## 2017-08-30 NOTE — IP AVS SNAPSHOT
[address] [place] [address]    Phone:  [phone]                                       After Visit Summary   8/30/2017    Riaz Sim    MRN: [id number]           After Visit Summary Signature Page     I have received my discharge instructions, and my questions have been answered. I have discussed any challenges I see with this plan with the nurse or doctor.    ..........................................................................................................................................  Patient/Patient Representative Signature      ..........................................................................................................................................  Patient Representative Print Name and Relationship to Patient    ..................................................               ................................................  Date                                            Time    ..........................................................................................................................................  Reviewed by Signature/Title    ...................................................              ..............................................  Date                                                            Time

## 2017-08-30 NOTE — DISCHARGE INSTRUCTIONS
G-tube Exchange Discharge Instructions     After you go home:      You may resume your normal diet    Care of Insertion Site:      For the first 48 hrs, check your puncture site every couple hours while you are awake     You may remove/change the dressing tomorrow    You may shower tomorrow    No tub baths, whirlpools or swimming until your puncture site has fully healed     Activity       You may go back to normal activity in 24 hours    Wait 48 hours before lifting, straining, exercise or other strenuous activity    Medicines:      You may resume all medications    Resume your Warfarin/Coumadin at your regular dose today. Follow up with your provider to have your INR rechecked    Resume your Platelet Inhibitors and Aspirin tomorrow at your regular dose    For minor pain, you may take Acetaminophen (Tylenol) or Ibuprofen (Advil)                 Call the provider who ordered this procedure if:      Blood or fluid is draining from the site    The site is red, swollen, hot, tender or there is foul-smelling drainage    Chills or a fever greater than 101 F (38 C)    Increased pain at the site    Any questions or concerns    Call  911 or go to the Emergency Room if:      Severe pain or trouble breathing    Bleeding that you cannot control      If you have questions call:          [facility] Radiology Dept @ [phone]        The provider who performed your procedure was _________________.        Caring for your G-tube    Tube Maintenance:    Possible problems with your tube may include:      Clogged with medications or feedings - most obstructions can be cleared with a small (3cc) syringe and warm water. This may be repeated until the tube is unclogged. This can be prevented by frequently flushing the tube with water (60cc) during the day and always after medications & feedings.      Tube pulls out or falls out -cover the opening with gauze & tape. Call [phone] for further instructions      Skin breakdown  and/or yeast infections at the insertion site - use of skin barrier ointments and anti-fungal powders can treat most site irritations.  Ask your pharmacist or provider for assistance (a prescription is not necessary).    In general, tube problems (including pulled tubes) are NOT emergency situations. Unless the pulling out of a tube is accompanied by uncontrollable bleeding, please DO NOT GO TO THE EMERGENCY ROOM!  Call [phone] with problems.    Tube Care:      Change the gauze dressing every 24 hours and if soiled (dirty).  Stabilize all tubes securely by using gauze and tape.  Clean tube site with soap & water using a cotton applicator (Q-tip) as needed to prevent irritation.    Flush feeding tube with 60cc of warm or room temperature water before and after meds.  To prevent the tube from clogging, ask your provider or pharmacist if liquid forms of your medications are available. If not, crush the pills well & be sure to flush the tube before & after all medications.    Flush feeding tube a minimum of every 4 hours and when feeding is completed with 60cc of water to keep the tube clear and avoid clogging.    Pt may use an abdominal (waist) binder to protect the G-tube.    If there is continued oozing or bleeding, redness, yellow/green/foul smelling drainage    STOP the feedings & use of the tube immediately if there is:      Continued oozing or bleeding at the site    Redness    Yellow/green or foul smelling drainage at the site    Uncontrolled stomach pain    Many of the supplies mentioned above can be purchased at your local pharmacy      For issues with your tube, please call:    [facility] Dept at [phone]

## 2017-08-30 NOTE — PROGRESS NOTES
Here for G-tube exchange from nursing home, Here alone, speaks no english. Difficult transfer from wheelchair pt arrived in to cart, poor weight bearing. Having some dry heaves and nausea but no emesis. Reviewed nursing home medications, not noted as to when last taken. IR staff notified about emesis. Discharge instructions sent to nursing home with paperwork.

## 2017-08-30 NOTE — IP AVS SNAPSHOT
MRN:[id number]                      After Visit Summary   8/30/2017    Riaz Sim    MRN: [id number]           Visit Information        Department      8/30/2017  3:02 PM [facility]          Review of your medicines      UNREVIEWED medicines. Ask your doctor about these medicines        Dose / Directions    acetaminophen 32 mg/mL solution   Commonly known as:  TYLENOL        Dose:  650 mg   650 mg by Per G Tube route every 4 hours as needed. Give 20.31 ml (650mg)   Refills:  0       ARTIFICIAL TEARS 1.4 % ophthalmic solution   Generic drug:  polyvinyl alcohol        Dose:  2 drop   Place 2 drops into the right eye as needed for dry eyes   Refills:  0       ASPIRIN PO        Dose:  81 mg   81 mg by Per G Tube route daily   Refills:  0       cyanocobalamin 1000 MCG/ML injection   Commonly known as:  VITAMIN B12        Dose:  1 mL   Inject 1 mL as directed every 30 days.   Refills:  0       guaiFENesin 20 mg/mL Soln solution   Commonly known as:  ROBITUSSIN        Dose:  10 mL   10 mLs by Per G Tube route every 4 hours as needed for cough   Refills:  0       SALINE NASAL SPRAY NA        Dose:  2 spray   Spray 2 sprays into both nostrils as needed   Refills:  0       sennosides 8.6 MG tablet   Commonly known as:  SENOKOT        Dose:  2 tablet   2 tablets by Per G Tube route At Bedtime   Refills:  0       SEROQUEL PO        Dose:  12.5 mg   12.5 mg by Per G Tube route daily And once daily prn   Refills:  0       Zinc oxide 10 % Crea        Apply topically 2 times daily And PRN   Refills:  0                Protect others around you: Learn how to safely use, store and throw away your medicines at www.disposemymeds.org.         Follow-ups after your visit         Care Instructions        Further instructions from your care team       G-tube Exchange Discharge Instructions     After you go home:      You may resume your normal diet    Care of Insertion Site:      For the first 48  hrs, check your puncture site every couple hours while you are awake     You may remove/change the dressing tomorrow    You may shower tomorrow    No tub baths, whirlpools or swimming until your puncture site has fully healed     Activity       You may go back to normal activity in 24 hours    Wait 48 hours before lifting, straining, exercise or other strenuous activity    Medicines:      You may resume all medications    Resume your Warfarin/Coumadin at your regular dose today. Follow up with your provider to have your INR rechecked    Resume your Platelet Inhibitors and Aspirin tomorrow at your regular dose    For minor pain, you may take Acetaminophen (Tylenol) or Ibuprofen (Advil)                 Call the provider who ordered this procedure if:      Blood or fluid is draining from the site    The site is red, swollen, hot, tender or there is foul-smelling drainage    Chills or a fever greater than 101 F (38 C)    Increased pain at the site    Any questions or concerns    Call  911 or go to the Emergency Room if:      Severe pain or trouble breathing    Bleeding that you cannot control      If you have questions call:          [facility] Radiology Dept @ [phone]        The provider who performed your procedure was _________________.        Caring for your G-tube    Tube Maintenance:    Possible problems with your tube may include:      Clogged with medications or feedings - most obstructions can be cleared with a small (3cc) syringe and warm water. This may be repeated until the tube is unclogged. This can be prevented by frequently flushing the tube with water (60cc) during the day and always after medications & feedings.      Tube pulls out or falls out -cover the opening with gauze & tape. Call [phone] for further instructions      Skin breakdown and/or yeast infections at the insertion site - use of skin barrier ointments and anti-fungal powders can treat most site irritations.  Ask your  "pharmacist or provider for assistance (a prescription is not necessary).    In general, tube problems (including pulled tubes) are NOT emergency situations. Unless the pulling out of a tube is accompanied by uncontrollable bleeding, please DO NOT GO TO THE EMERGENCY ROOM!  Call [phone] with problems.    Tube Care:      Change the gauze dressing every 24 hours and if soiled (dirty).  Stabilize all tubes securely by using gauze and tape.  Clean tube site with soap & water using a cotton applicator (Q-tip) as needed to prevent irritation.    Flush feeding tube with 60cc of warm or room temperature water before and after meds.  To prevent the tube from clogging, ask your provider or pharmacist if liquid forms of your medications are available. If not, crush the pills well & be sure to flush the tube before & after all medications.    Flush feeding tube a minimum of every 4 hours and when feeding is completed with 60cc of water to keep the tube clear and avoid clogging.    Pt may use an abdominal (waist) binder to protect the G-tube.    If there is continued oozing or bleeding, redness, yellow/green/foul smelling drainage    STOP the feedings & use of the tube immediately if there is:      Continued oozing or bleeding at the site    Redness    Yellow/green or foul smelling drainage at the site    Uncontrolled stomach pain    Many of the supplies mentioned above can be purchased at your local pharmacy      For issues with your tube, please call:    [facility] at [phone]               Additional Information About Your Visit        [employer] Information     [employer] lets you send messages to your doctor, view your test results, renew your prescriptions, schedule appointments and more. To sign up, go to [email or web address] . Click on \"Log in\" on the left side of the screen, which will take you to the Welcome page. Then click on \"Sign up Now\" on the right side of the page.     You will " be asked to enter the access code listed below, as well as some personal information. Please follow the directions to create your username and password.     Your access code is: [email or web address]  Expires: 2017  1:02 PM     Your access code will  in 90 days. If you need help or a new code, please call your [facility] clinic or [phone].        Care EveryWhere ID     This is your Care EveryWhere ID. This could be used by other organizations to access your [facility] medical records  [id number]        Your Vitals Were     Blood Pressure Pulse Temperature Respirations Pulse Oximetry       177/90 85 95.7  F (35.4  C) (Axillary) 16 98%        Primary Care Provider Office Phone # Fax #    Eileen Aguiar, APRN [phone]      Equal Access to Services     [facility] Hospital[facility] : Hadii dawood biswas hadasho Sofernandaali, waaxda luqadaha, qaybta kaalmada joycelynyanicholas, marlin staton . So [facility] [phone].    ATENCIÓN: Si habla español, tiene a sadler disposición servicios gratuitos de asistencia lingüística. Jazmin al [phone].    We comply with applicable federal civil rights laws and [place] laws. We do not discriminate on the basis of race, color, national origin, age, disability sex, sexual orientation or gender identity.            Thank you!     Thank you for choosing [facility] for your care. Our goal is always to provide you with excellent care. Hearing back from our patients is one way we can continue to improve our services. Please take a few minutes to complete the written survey that you may receive in the mail after you visit with us. Thank you!             Medication List: This is a list of all your medications and when to take them. Check marks below indicate your daily home schedule. Keep this list as a reference.      Medications           Morning Afternoon Evening Bedtime As Needed    acetaminophen 32 mg/mL solution   Commonly known as:  TYLENOL   650 mg by Per G Tube route  every 4 hours as needed. Give 20.31 ml (650mg)                                ARTIFICIAL TEARS 1.4 % ophthalmic solution   Place 2 drops into the right eye as needed for dry eyes   Generic drug:  polyvinyl alcohol                                ASPIRIN PO   81 mg by Per G Tube route daily                                cyanocobalamin 1000 MCG/ML injection   Commonly known as:  VITAMIN B12   Inject 1 mL as directed every 30 days.                                guaiFENesin 20 mg/mL Soln solution   Commonly known as:  ROBITUSSIN   10 mLs by Per G Tube route every 4 hours as needed for cough                                SALINE NASAL SPRAY NA   Ewen 2 sprays into both nostrils as needed                                sennosides 8.6 MG tablet   Commonly known as:  SENOKOT   2 tablets by Per G Tube route At Bedtime                                SEROQUEL PO   12.5 mg by Per G Tube route daily And once daily prn                                Zinc oxide 10 % Crea   Apply topically 2 times daily And PRN

## 2017-09-01 ENCOUNTER — NURSING HOME VISIT (OUTPATIENT)
Dept: GERIATRICS | Facility: CLINIC | Age: 82
End: 2017-09-01
Payer: COMMERCIAL

## 2017-09-01 VITALS
HEIGHT: 61 IN | BODY MASS INDEX: 18.18 KG/M2 | DIASTOLIC BLOOD PRESSURE: 70 MMHG | HEART RATE: 64 BPM | SYSTOLIC BLOOD PRESSURE: 144 MMHG | WEIGHT: 96.3 LBS | RESPIRATION RATE: 16 BRPM | TEMPERATURE: 97 F | OXYGEN SATURATION: 97 %

## 2017-09-01 DIAGNOSIS — M25.562 ACUTE PAIN OF LEFT KNEE: Primary | ICD-10-CM

## 2017-09-01 DIAGNOSIS — R13.10 DYSPHAGIA, UNSPECIFIED TYPE: ICD-10-CM

## 2017-09-01 DIAGNOSIS — F01.52: ICD-10-CM

## 2017-09-01 PROCEDURE — 99309 SBSQ NF CARE MODERATE MDM 30: CPT | Performed by: NURSE PRACTITIONER

## 2017-09-01 NOTE — PROGRESS NOTES
[facility]    Chief Complaint   Patient presents with     Nursing Home Acute       HPI:    Riaz Sim is a 89 year old  (3/11/1925), who is being seen today for an episodic care visit at [facility] .  HPI information obtained from: facility chart records, facility staff and [facility] chart review.Today's concern is:  Acute pain of left knee  Notified by staff that patient had a swollen, painful left knee. There is no known history of OA. Patient was examined while in bed. She does not respond to questions as she is cantonese speaking but grimaces with movement of left knee. There is no redness or warmth, but appears slightly larger than right knee. Currently has tylenol PRN for pain.    - acetaminophen (TYLENOL) 32 mg/mL solution; 31.25 mLs (1,000 mg) by Per G Tube route 3 times daily Give 20.31 ml (650mg)  - traMADol (ULTRAM) 50 MG tablet; Take 1 tablet (50 mg) by mouth every 6 hours as needed for pain maximum 4 tablet(s) per day    Dementia, vascular, with delusions (H)  Per family, she has delusions which are managed with quetiapine, last GDR in December 2016 and June of this year, now on 12.5 mg daily and daily PRN. Behavior and delusions are in control.     Dysphagia, unspecified type  Currently receives cyclic tube feedings from [phone]. PEG became clogged on Wednesday and she went to [facility] for a replacement. No N/V, tolerating feeds well.       WEIGHT: 8/23/17 95.6 LBS AND 8/30/17 96.3 LBS  BP: 144-148/70    ALLERGIES: Review of patient's allergies indicates no known allergies.  Past Medical, Surgical, Family and Social History reviewed and updated in [facility].    Current Outpatient Prescriptions   Medication Sig Dispense Refill     acetaminophen (TYLENOL) 32 mg/mL solution 31.25 mLs (1,000 mg) by Per G Tube route 3 times daily Give 20.31 ml (650mg) 300 mL 0     traMADol (ULTRAM) 50 MG tablet Take 1 tablet (50 mg) by mouth every 6 hours as needed for pain maximum 4 tablet(s) per  "day 20 tablet 0     sennosides (SENOKOT) 8.6 MG tablet 2 tablets by Per G Tube route At Bedtime       Zinc oxide 10 % CREA Apply topically 2 times daily And PRN       polyvinyl alcohol (ARTIFICIAL TEARS) 1.4 % ophthalmic solution Place 2 drops into the right eye as needed for dry eyes       guaiFENesin (ROBITUSSIN) 100 MG/5ML SOLN 10 mLs by Per G Tube route every 4 hours as needed for cough       QUEtiapine Fumarate (SEROQUEL PO) 12.5 mg by Per G Tube route daily And once daily prn       ASPIRIN PO 81 mg by Per G Tube route daily       cyanocobalamin 1000 MCG/ML injection Inject 1 mL as directed every 30 days.       SALINE NASAL SPRAY NA [place] 2 sprays into both nostrils as needed        Medications reviewed:  Medications reconciled to facility chart and changes were made to reflect current medications as identified as above med list. Below are the changes that were made:   Medications stopped since last EPIC medication reconciliation:   There are no discontinued medications.    Medications started since last [facility] medication reconciliation:  No orders of the defined types were placed in this encounter.    REVIEW OF SYSTEMS:  Unobtainable secondary to cognitive impairment or aphasia.    Physical Exam:  /70  Pulse 64  Temp 97  F (36.1  C)  Resp 16  Ht 5' 0.5\" (1.537 m)  Wt 96 lb 4.8 oz (43.7 kg)  SpO2 97%  BMI 18.5 kg/m2  GENERAL APPEARANCE:  in no distress, cooperative. Examined while in the bed.  EYES:  Symmetric, no drainage. Sclera white and conjunctiva clear. PERRL.   RESP:  Respirations non-labored, no respiratory distress. On room air.   CV:  no edema to bilateral LE. No open areas on extremities.   ABDOMEN:  bowel sounds present x4. Abdomen soft, nontender, no guarding or rebound. No distension, G-tube in place.   M/S:   Very limited ROM to LUE and LLE which is baseline. Grimaces with movement of left leg.   NEURO:  No facial asymmetry.  PSYCH:  cooperative. GIOVANNI assess orientation   Recent Labs:  "     CBC RESULTS:   Recent Labs   Lab Test 08/02/17 02/08/17  07/18/14  10/25/12   0745   WBC   --    --    --   5.1  6.3   RBC   --    --    --   4.19  4.16   HGB  13.0  13.1   < >  13.3  13.3   HCT   --    --    --   40.5  39.6   MCV   --    --    --   97  95   MCH   --    --    --   32  32.0   MCHC   --    --    --   33  33.6   RDW   --    --    --   12.6  12.7   PLT   --    --    --   237  205    < > = values in this interval not displayed.       Last Basic Metabolic Panel:  Recent Labs   Lab Test 08/02/17 02/22/17 02/08/17  07/18/14  10/25/12   0745  06/10/11   0915   NA  141   --   143   < >  143  143  140   POTASSIUM  3.7  3.8  4.1   < >  3.5  4.2  3.3*   CHLORIDE   --    --    --    --   107  104  103   CAROLYN   --    --    --    --   9.1  9.3  8.5   CO2   --    --    --    --    --   25  25   BUN  16   --   21   < >  22  23  14   CR  0.60   --   0.70   < >  0.74  0.68  0.66   GLC   --    --    --    --   87  97  96    < > = values in this interval not displayed.       Assessment/Plan:  (M25.562) Acute pain of left knee  (primary encounter diagnosis)  Comment: acute. OA vs contractures from limited range of motion. No s/s of joint infection. No fever, warmth or redness.   Plan: acetaminophen 1000 mg TID via G tube and traMADol (ULTRAM) 50 MG tablet q6h PRN for moderate to severe pain.          (F01.51,  F22) Dementia, vascular, with delusions (H)  Comment: chronic, progressive.   Plan: continue with seroquel scheduled and PRN.    (R13.10) Dysphagia, unspecified type  Comment: chronic, on cyclic tube feedings. Last tube exchange this week due to clogging.   Plan: continue with feeds. Dietician following.     Electronically signed by  RADHA Navarro CNP

## 2017-09-02 RX ORDER — TRAMADOL HYDROCHLORIDE 50 MG/1
50 TABLET ORAL EVERY 6 HOURS PRN
Qty: 20 TABLET | Refills: 0 | COMMUNITY
Start: 2017-09-02 | End: 2018-02-09

## 2017-09-08 ENCOUNTER — TRANSFERRED RECORDS (OUTPATIENT)
Dept: HEALTH INFORMATION MANAGEMENT | Facility: CLINIC | Age: 82
End: 2017-09-08

## 2017-09-11 ENCOUNTER — NURSING HOME VISIT (OUTPATIENT)
Dept: GERIATRICS | Facility: CLINIC | Age: 82
End: 2017-09-11
Payer: COMMERCIAL

## 2017-09-11 VITALS
DIASTOLIC BLOOD PRESSURE: 70 MMHG | BODY MASS INDEX: 18.5 KG/M2 | HEART RATE: 64 BPM | WEIGHT: 96.3 LBS | RESPIRATION RATE: 16 BRPM | TEMPERATURE: 97 F | SYSTOLIC BLOOD PRESSURE: 144 MMHG | OXYGEN SATURATION: 97 %

## 2017-09-11 DIAGNOSIS — F01.52: ICD-10-CM

## 2017-09-11 DIAGNOSIS — I69.959 HEMIPLEGIA, LATE EFFECT OF CEREBROVASCULAR DISEASE (H): ICD-10-CM

## 2017-09-11 DIAGNOSIS — M17.10 ARTHRITIS OF KNEE: Primary | ICD-10-CM

## 2017-09-11 PROCEDURE — 99309 SBSQ NF CARE MODERATE MDM 30: CPT | Mod: 25 | Performed by: NURSE PRACTITIONER

## 2017-09-11 PROCEDURE — 20610 DRAIN/INJ JOINT/BURSA W/O US: CPT | Performed by: NURSE PRACTITIONER

## 2017-09-11 NOTE — PROGRESS NOTES
[facility]    Chief Complaint   Patient presents with     Nursing Home Acute       HPI:    Riaz Sim is a 89 year old  (3/11/1925), who is being seen today for an episodic care visit at [facility].    HPI information obtained from: facility chart records, facility staff, [facility] chart review and family/first contact granddaughter Virginia report. Today's concern is:    Arthritis of knee  Hemiplegia, late effect of cerebrovascular disease (H)  Dementia, vascular, with delusions (H)  Patient with hx CVA and hemiplegia, she has dementia and is non-english speaking. Last week developed redness, swelling of left knee. Medications (Tylenol and tramadol) did not appear fully effective. No known injury. Obtained xray left knee which showed arthritis, no injuries.   NP asked ortho PA to review films - he felt steroid injection may be appropriate due to xray results and symptoms. Patient observed rubbing knee as if in pain. Does have contractures in knees and is wheelchair and bed bound. PA spoke to granddaughter who gave consent  Also spoke to therapy today - they will evaluate patient for wheelchair positioning as she appears to lean forward/slip down when up in chair.       REVIEW OF SYSTEMS:  Unobtainable secondary to cognitive impairment or aphasia.    OBJECTIVE:   /70  Pulse 64  Temp 97  F (36.1  C)  Resp 16  Wt 96 lb 4.8 oz (43.7 kg)  SpO2 97%  BMI 18.5 kg/m2  GENERAL APPEARANCE:  Alert, in no distress  Unable to assess orientation - non english speaking  On room air, no cough, no respiratory distress.   No LE edema and HRR  Knees - contractures bilaterally. Does have some pain with attempted ROM left knee and minimally more pink than right side.       ASSESSMENT/PLAN:     Arthritis of knee  Hemiplegia, late effect of cerebrovascular disease (H)  Dementia, vascular, with delusions (H)   Acute onset of knee pain, also chronic dementia and hemiplegia. Requested and completed  the following:     Orthopedic on-site visit, with NP; CC  Left knee pain, hs of OA, x-rays obtained prior to visit. Medial/ lateral/ PF joint arthritis, noted;    Exam: seated in W/C, note flexion contracture; 90/ PROM -70/90/ lig intact, no noted effusion, no swelling. Pain on palpation to knee joint; noted crepitus, .  Discussed with family member today, via pone, Cortisone injection to relieve pain / R&B discussed, family wishes to proceed;    Procedure; Left knee prepped; injected medial joint space, with 1 cc depo medrol, 4 cc 1% lidocaine, into knee joint, tolerated well, no complaints,  A/P will observe symptoms, and repeat injection, 3-4 months prn pain      [place] OPA-C with Kyung Lr NP      Total time spent with patient visit at the skilled nursing facility was 25 min including patient visit, review of past records and phone call to patient contact. Greater than 50% of total time spent with counseling and coordinating care due to review status, POC and present status with family and staff    Electronically signed by RADHA Marquez, GNP

## 2017-09-11 NOTE — MR AVS SNAPSHOT
"              After Visit Summary   9/11/2017    Riaz Sim    MRN: [id number]           Patient Information     Date Of Birth          3/11/1925        Visit Information        Provider Department      9/11/2017 1:00 PM Kyung Lr APRN CNP [facility]        Today's Diagnoses     Arthritis of knee    -  1    Hemiplegia, late effect of cerebrovascular disease (H)        Dementia, vascular, with delusions (H)           Follow-ups after your visit        Your next 10 appointments already scheduled     Sep 11, 2017  1:00 PM CDT   Nursing Home with RADHA Ashley CNP   [facility] ([facility])    [address] [id number] [phone]              Who to contact     If you have questions or need follow up information about today's clinic visit or your schedule please contact [facility] directly at [phone].  Normal or non-critical lab and imaging results will be communicated to you by MyChart, letter or phone within 4 business days after the clinic has received the results. If you do not hear from us within 7 days, please contact the clinic through [employer] or phone. If you have a critical or abnormal lab result, we will notify you by phone as soon as possible.  Submit refill requests through [employer] or call your pharmacy and they will forward the refill request to us. Please allow 3 business days for your refill to be completed.          Additional Information About Your Visit        MyChart Information     [employer] lets you send messages to your doctor, view your test results, renew your prescriptions, schedule appointments and more. To sign up, go to [email or web address] . Click on \"Log in\" on the left side of the screen, which will take you to the Welcome page. Then click on \"Sign up Now\" on the right side of the page.     You will be asked to enter the access code listed below, as well as some " personal information. Please follow the directions to create your username and password.     Your access code is: [email or web address]  Expires: 2017  1:02 PM     Your access code will  in 90 days. If you need help or a new code, please call your [place] clinic or [phone].        Care EveryWhere ID     This is your Care EveryWhere ID. This could be used by other organizations to access your [facility] medical records  [id number]        Your Vitals Were     Pulse Temperature Respirations Pulse Oximetry BMI (Body Mass Index)       64 97  F (36.1  C) 16 97% 18.5 kg/m2        Blood Pressure from Last 3 Encounters:   17 144/70   17 144/70   17 131/72    Weight from Last 3 Encounters:   17 96 lb 4.8 oz (43.7 kg)   17 96 lb 4.8 oz (43.7 kg)   17 99 lb 12.8 oz (45.3 kg)              We Performed the Following     DRAIN/INJECT LARGE JOINT/BURSA        Primary Care Provider Office Phone # Fax #    Eileen Ingramdamian Aguiar, APRN [phone]       [address] [address]        Equal Access to Services     [facility] : Hadii aad ku hadasho Soomaali, waaxda luqadaha, qaybta kaalmada adeegyada, waxay idiin haysahara staton . So [facility] [phone].    ATENCIÓN: Si habla español, tiene a sadler disposición servicios gratuitos de asistencia lingüística. Llmaurice al [phone].    We comply with applicable federal civil rights laws and [place] laws. We do not discriminate on the basis of race, color, national origin, age, disability sex, sexual orientation or gender identity.            Thank you!     Thank you for choosing [facility]  for your care. Our goal is always to provide you with excellent care. Hearing back from our patients is one way we can continue to improve our services. Please take a few minutes to complete the written survey that you may receive in the mail after your visit with us. Thank you!             Your Updated  Medication List - Protect others around you: Learn how to safely use, store and throw away your medicines at www.disposemymeds.org.          This list is accurate as of: 9/11/17 11:31 AM.  Always use your most recent med list.                   Brand Name Dispense Instructions for use Diagnosis    acetaminophen 32 mg/mL solution    TYLENOL    300 mL    31.25 mLs (1,000 mg) by Per G Tube route 3 times daily Give 20.31 ml (650mg)    Acute pain of left knee       ARTIFICIAL TEARS 1.4 % ophthalmic solution   Generic drug:  polyvinyl alcohol      Place 2 drops into the right eye as needed for dry eyes        ASPIRIN PO      81 mg by Per G Tube route daily        cyanocobalamin 1000 MCG/ML injection    VITAMIN B12     Inject 1 mL as directed every 30 days.        guaiFENesin 20 mg/mL Soln solution    ROBITUSSIN     10 mLs by Per G Tube route every 4 hours as needed for cough        SALINE NASAL SPRAY NA      [place] 2 sprays into both nostrils as needed        sennosides 8.6 MG tablet    SENOKOT     2 tablets by Per G Tube route At Bedtime        SEROQUEL PO      12.5 mg by Per G Tube route daily And once daily prn        traMADol 50 MG tablet    ULTRAM    20 tablet    Take 1 tablet (50 mg) by mouth every 6 hours as needed for pain maximum 4 tablet(s) per day    Acute pain of left knee       Zinc oxide 10 % Crea      Apply topically 2 times daily And PRN

## 2017-10-08 ENCOUNTER — NURSING HOME VISIT (OUTPATIENT)
Dept: GERIATRICS | Facility: CLINIC | Age: 82
End: 2017-10-08
Payer: COMMERCIAL

## 2017-10-08 DIAGNOSIS — R13.10 DYSPHAGIA, UNSPECIFIED TYPE: ICD-10-CM

## 2017-10-08 DIAGNOSIS — I69.959 HEMIPLEGIA, LATE EFFECT OF CEREBROVASCULAR DISEASE (H): Primary | ICD-10-CM

## 2017-10-08 DIAGNOSIS — F01.52: ICD-10-CM

## 2017-10-08 DIAGNOSIS — M17.10 ARTHRITIS OF KNEE: ICD-10-CM

## 2017-10-08 PROCEDURE — 99309 SBSQ NF CARE MODERATE MDM 30: CPT | Performed by: NURSE PRACTITIONER

## 2017-10-09 VITALS
DIASTOLIC BLOOD PRESSURE: 83 MMHG | RESPIRATION RATE: 18 BRPM | OXYGEN SATURATION: 96 % | WEIGHT: 93.9 LBS | TEMPERATURE: 97.6 F | HEART RATE: 78 BPM | SYSTOLIC BLOOD PRESSURE: 143 MMHG | BODY MASS INDEX: 18.04 KG/M2

## 2017-10-09 NOTE — PROGRESS NOTES
[facility]    Chief Complaint   Patient presents with     custodial Regulatory       HPI:    Riaz Sim is a 89 year old  (3/11/1925), who is being seen today for a federally mandated E/M visit at [facility] .  HPI information obtained from: facility chart records. Today's concerns are:    Hemiplegia, late effect of cerebrovascular disease (H)  Dementia, vascular, with delusions (H)  Dysphagia, unspecified type  Currently receives cyclic tube feedings from 6702-1704. Per family, she has delusions which are managed with quetiapine, last GDR in December 2016 and June 2017, now on 12.5 mg daily and 12.5 mg once daily PRN. Behavior and delusions are in control.    Osteoarthritis  Has had increased pain in L knee. An xray was obtained last month and was consistent with osteoarthritis. She received a steroid injection on 9/11 and pain appears to have improved. She continues on Tylenol 1000 mg TID and Tramadol 50 mg q6H PRN.     Essential hypertension, benign  Chronic. Diet controlled  Weight: 93.9-99 lbs  BP: 140-143/80-83 mmHg  P: 64-78  O2: 96-97% on room air.     ALLERGIES: Review of patient's allergies indicates no known allergies.  PAST MEDICAL HISTORY:  has a past medical history of Cerebral embolism with cerebral infarction (H) (7/7/2011); CVA (cerebral infarction); Dementia; Diabetes mellitus (H); Dysphagia; Encephalopathy; Essential hypertension, benign; G tube feedings (H); Hemiplegia (H); Insomnia; Nosebleeds (7/25/2011); Osteoporosis, senile; Pernicious anemia; Seizures (H); and Unspecified cerebral artery occlusion with cerebral infarction.  PAST SURGICAL HISTORY:  has a past surgical history that includes NONSPECIFIC PROCEDURE.  FAMILY HISTORY: family history is not on file.  SOCIAL HISTORY:  reports that she has never smoked. She does not have any smokeless tobacco history on file. She reports that she does not drink alcohol or use illicit drugs.    MEDICATIONS:  Current  Outpatient Prescriptions   Medication Sig Dispense Refill     acetaminophen (TYLENOL) 32 mg/mL solution 31.25 mLs (1,000 mg) by Per G Tube route 3 times daily Give 20.31 ml (650mg) 300 mL 0     traMADol (ULTRAM) 50 MG tablet Take 1 tablet (50 mg) by mouth every 6 hours as needed for pain maximum 4 tablet(s) per day 20 tablet 0     sennosides (SENOKOT) 8.6 MG tablet 2 tablets by Per G Tube route At Bedtime       Zinc oxide 10 % CREA Apply topically 2 times daily And PRN       polyvinyl alcohol (ARTIFICIAL TEARS) 1.4 % ophthalmic solution Place 2 drops into the right eye as needed for dry eyes       guaiFENesin (ROBITUSSIN) 100 MG/5ML SOLN 10 mLs by Per G Tube route every 4 hours as needed for cough       QUEtiapine Fumarate (SEROQUEL PO) 12.5 mg by Per G Tube route daily And once daily prn       ASPIRIN PO 81 mg by Per G Tube route daily       cyanocobalamin 1000 MCG/ML injection Inject 1 mL as directed every 30 days.       SALINE NASAL SPRAY NA [place] 2 sprays into both nostrils as needed        Medications reviewed:  Medications reconciled to facility chart and changes were made to reflect current medications as identified as above med list. Below are the changes that were made:   Medications stopped since last EPIC medication reconciliation:   There are no discontinued medications.    Medications started since last [facility] medication reconciliation:  No orders of the defined types were placed in this encounter.      Case Management:  I have reviewed the care plan and MDS and do agree with the plan. Patient's desire to return to the community is not assessible due to cognitive impairment.  Information reviewed:  Medications, vital signs, orders, and nursing notes.    ROS:  Unobtainable secondary to cognitive impairment or aphasia.    Exam:  Vitals: /83  Pulse 78  Temp 97.6  F (36.4  C)  Resp 18  Wt 93 lb 14.4 oz (42.6 kg)  SpO2 96%  BMI 18.04 kg/m2  BMI= Body mass index is 18.04 kg/(m^2).  GENERAL  APPEARANCE:  in no distress, cooperative. Sleeping comfortably  RESP:  Respirations non-labored, no respiratory distress. On room air. Lung sounds clear.  CV:  no edema to bilateral LE. No open areas on extremities.   ABDOMEN:  bowel sounds present. Abdomen soft, nontender, G-tube in place.   M/S:   Very limited ROM to LUE and LLE which is baseline, contracture present  NEURO:  No facial asymmetry. Unable to follow directions.   PSYCH:  cooperative. GIOVANNI assess orientation     Lab/Diagnostic data:   CBC RESULTS:   Recent Labs   Lab Test 08/02/17 02/08/17  07/18/14  10/25/12   0745   WBC   --    --    --   5.1  6.3   RBC   --    --    --   4.19  4.16   HGB  13.0  13.1   < >  13.3  13.3   HCT   --    --    --   40.5  39.6   MCV   --    --    --   97  95   MCH   --    --    --   32  32.0   MCHC   --    --    --   33  33.6   RDW   --    --    --   12.6  12.7   PLT   --    --    --   237  205    < > = values in this interval not displayed.       Last Basic Metabolic Panel:  Recent Labs   Lab Test 08/02/17 02/22/17 02/08/17  07/18/14  10/25/12   0745  06/10/11   0915   NA  141   --   143   < >  143  143  140   POTASSIUM  3.7  3.8  4.1   < >  3.5  4.2  3.3*   CHLORIDE   --    --    --    --   107  104  103   CAROLYN   --    --    --    --   9.1  9.3  8.5   CO2   --    --    --    --    --   25  25   BUN  16   --   21   < >  22  23  14   CR  0.60   --   0.70   < >  0.74  0.68  0.66   GLC   --    --    --    --   87  97  96    < > = values in this interval not displayed.       ASSESSMENT/PLAN  (I69.959) Hemiplegia, late effect of cerebrovascular disease (H)  (primary encounter diagnosis)  (F01.51,  F22) Dementia, vascular, with delusions (H)  (R13.10) Dysphagia, unspecified type  Comment: chronic. Requires 24/7 nursing cares. Has lost 6 lbs since June.   Plan: dietician following for tube feeding and weight loss. 24/7 total care resident.     (M17.10) Arthritis of knee  Comment: chronic. Pain controlled after steroid injection.    Plan: continue with scheduled tylenol and Tramadol PRN.       Electronically signed by:  RADHA Navarro CNP

## 2017-12-06 VITALS
BODY MASS INDEX: 17.67 KG/M2 | WEIGHT: 93.6 LBS | HEART RATE: 68 BPM | OXYGEN SATURATION: 96 % | SYSTOLIC BLOOD PRESSURE: 130 MMHG | RESPIRATION RATE: 18 BRPM | HEIGHT: 61 IN | TEMPERATURE: 98.5 F | DIASTOLIC BLOOD PRESSURE: 75 MMHG

## 2017-12-06 NOTE — PROGRESS NOTES
[facility]  Nursing Home Regulatory Visit  December 7, 2017    Chief Complaint   Patient presents with     retirement Regulatory       HPI:    Riaz Sim is a 89 year old  (3/11/1925), who is being seen today for a federally mandated E/M visit at [facility]. Today's concerns are:    1) Vascular Dementia with Delusions -- Chronic. Progressive. Requires 24/7 cares. Delusions managed with quetiapine 12.5 mg daily and q12h PRN   2) Slow Transit Constipation -- managed with Senna 2 tabs qhs  3) CVA w/ Dysphagia & Hemiplegia -- is maintained on tube feeds (Glucerna 1.5 Rate: 45ml/hr for 15 hrs 6172-7918). PEG changed on 8/30/17. Weights down about 10 lbs from a year ago. Is on ASA 81 mg daily    ALLERGIES: Review of patient's allergies indicates no known allergies.    PAST MEDICAL HISTORY:   Past Medical History:   Diagnosis Date     Cerebral embolism with cerebral infarction (H) 7/7/2011     CVA (cerebral infarction)     R side     Dementia      Diabetes mellitus (H)      Dysphagia      Encephalopathy      Essential hypertension, benign      G tube feedings (H)      Hemiplegia (H)     with left sided neglect     Insomnia      Nosebleeds 7/25/2011     Osteoporosis, senile      Pernicious anemia      Seizures (H)      Unspecified cerebral artery occlusion with cerebral infarction        PAST SURGICAL HISTORY:   Past Surgical History:   Procedure Laterality Date     C NONSPECIFIC PROCEDURE      left wrist surgery       FAMILY HISTORY:   No family history on file.    SOCIAL HISTORY:   Lives in a SNF    MEDICATIONS:  Current Outpatient Prescriptions   Medication Sig Dispense Refill     acetaminophen (TYLENOL) 32 mg/mL solution 31.25 mLs (1,000 mg) by Per G Tube route 3 times daily Give 20.31 ml (650mg) 300 mL 0     traMADol (ULTRAM) 50 MG tablet Take 1 tablet (50 mg) by mouth every 6 hours as needed for pain maximum 4 tablet(s) per day 20 tablet 0     sennosides (SENOKOT) 8.6 MG tablet 2 tablets  "by Per G Tube route At Bedtime       Zinc oxide 10 % CREA Apply topically 2 times daily And PRN       polyvinyl alcohol (ARTIFICIAL TEARS) 1.4 % ophthalmic solution Place 2 drops into the right eye as needed for dry eyes       guaiFENesin (ROBITUSSIN) 100 MG/5ML SOLN 10 mLs by Per G Tube route every 4 hours as needed for cough       QUEtiapine Fumarate (SEROQUEL PO) 12.5 mg by Per G Tube route daily And once daily prn       ASPIRIN PO 81 mg by Per G Tube route daily       cyanocobalamin 1000 MCG/ML injection Inject 1 mL as directed every 30 days.       SALINE NASAL SPRAY NA [place] 2 sprays into both nostrils as needed          Medications reviewed:  Medications reconciled to facility chart and changes were made to reflect current medications as identified as above med list. Below are the changes that were made:   Medications stopped since last EPIC medication reconciliation:   There are no discontinued medications.  Medications started since last [facility] medication reconciliation:  No orders of the defined types were placed in this encounter.      Case Management:  I have reviewed the care plan and MDS and do agree with the plan.   Information reviewed:  Medications, vital signs, orders, and nursing notes.    ROS:  Unable to be obtained due to cognitive impairment or aphasia.     PHYSICAL EXAM:  /75  Pulse 68  Temp 98.5  F (36.9  C)  Resp 18  Ht 5' 0.5\" (1.537 m)  Wt 93 lb 9.6 oz (42.5 kg)  SpO2 96%  BMI 17.98 kg/m2  Gen: laying in bed in no acute distress  Resp: breathing non-labored  GI: abdomen soft, non-distended  Ext: no LE edema  Neuro: CX II-XII grossly in tact  Psych: memory, judgement and insight impaired    Lab/Diagnostic data:  Reviewed as per Epic    ASSESSMENT/PLAN    1) Vascular Dementia with Delusions   Chronic. Progressive.   -- delusions managed with quetiapine 12.5 mg daily and q12h PRN   -- ongoing 24/7 nursing and supportive cares    2) Slow Transit Constipation   -- managed with Senna 2 " tabs qhs  -- adjust bowel regimen as needed    3) CVA w/ Dysphagia & Hemiplegia   She is maintained on tube feeds (Glucerna 1.5 Rate: 45ml/hr for 15 hrs 6104-5902). PEG changed on 8/30/17. Weights down about 10 lbs from a year ago.   -- continues ASA 81 mg daily  -- nutrition following      Riaz Sim is stable. No concerns per nursing. No changes to plan of care today.    Electronically signed by:  Candice Baron MD

## 2017-12-07 ENCOUNTER — NURSING HOME VISIT (OUTPATIENT)
Dept: GERIATRICS | Facility: CLINIC | Age: 82
End: 2017-12-07
Payer: COMMERCIAL

## 2017-12-07 DIAGNOSIS — F01.52 VASCULAR DEMENTIA WITH DELUSIONS (H): Primary | ICD-10-CM

## 2017-12-07 DIAGNOSIS — K59.01 SLOW TRANSIT CONSTIPATION: ICD-10-CM

## 2017-12-07 DIAGNOSIS — I69.359 CVA, OLD, HEMIPARESIS (H): ICD-10-CM

## 2017-12-07 DIAGNOSIS — R13.10 DYSPHAGIA, UNSPECIFIED TYPE: ICD-10-CM

## 2017-12-07 PROCEDURE — 99309 SBSQ NF CARE MODERATE MDM 30: CPT | Performed by: INTERNAL MEDICINE

## 2018-02-02 ENCOUNTER — NURSING HOME VISIT (OUTPATIENT)
Dept: GERIATRICS | Facility: CLINIC | Age: 83
End: 2018-02-02
Payer: COMMERCIAL

## 2018-02-02 VITALS
WEIGHT: 97.3 LBS | TEMPERATURE: 98.4 F | HEART RATE: 70 BPM | RESPIRATION RATE: 18 BRPM | BODY MASS INDEX: 18.69 KG/M2 | DIASTOLIC BLOOD PRESSURE: 70 MMHG | OXYGEN SATURATION: 96 % | SYSTOLIC BLOOD PRESSURE: 115 MMHG

## 2018-02-02 DIAGNOSIS — M17.10 ARTHRITIS OF KNEE: ICD-10-CM

## 2018-02-02 DIAGNOSIS — D51.0 PERNICIOUS ANEMIA: ICD-10-CM

## 2018-02-02 DIAGNOSIS — G47.00 INSOMNIA, UNSPECIFIED TYPE: ICD-10-CM

## 2018-02-02 DIAGNOSIS — I10 ESSENTIAL HYPERTENSION, BENIGN: ICD-10-CM

## 2018-02-02 DIAGNOSIS — F01.52: ICD-10-CM

## 2018-02-02 DIAGNOSIS — K59.01 SLOW TRANSIT CONSTIPATION: ICD-10-CM

## 2018-02-02 DIAGNOSIS — G40.909 SEIZURE DISORDER (H): ICD-10-CM

## 2018-02-02 DIAGNOSIS — R13.10 DYSPHAGIA, UNSPECIFIED TYPE: ICD-10-CM

## 2018-02-02 DIAGNOSIS — I69.959 HEMIPLEGIA, LATE EFFECT OF CEREBROVASCULAR DISEASE (H): ICD-10-CM

## 2018-02-02 DIAGNOSIS — Z00.00 VISIT FOR ANNUAL HEALTH EXAMINATION: Primary | ICD-10-CM

## 2018-02-02 DIAGNOSIS — Z71.89 ADVANCED DIRECTIVES, COUNSELING/DISCUSSION: ICD-10-CM

## 2018-02-02 PROCEDURE — 99318 ZZC ANNUAL NURSING FAC ASSESSMNT, STABLE: CPT | Performed by: NURSE PRACTITIONER

## 2018-02-02 NOTE — MR AVS SNAPSHOT
After Visit Summary   2/2/2018    Riaz Sim    MRN: [id number]           Patient Information     Date Of Birth          3/11/1925        Visit Information        Provider Department      2/2/2018 2:30 PM Kyung Lr APRN CNP [facility]        Today's Diagnoses     Visit for annual health examination 2/2/18    -  1    Hemiplegia, late effect of cerebrovascular disease (H)        Dysphagia, unspecified type        Seizure disorder (H)        Dementia, vascular, with delusions (H)        Insomnia, unspecified type        Essential hypertension, benign        Pernicious anemia        Slow transit constipation, attempts to dig our BM, better with supps        Arthritis of knee        Advanced directives, counseling/discussion           Follow-ups after your visit        Your next 10 appointments already scheduled     Feb 02, 2018  2:30 PM [facility]   Nursing Home with RADHA Ashley CNP   [facility] ([facility]    [address] [place] [address] [phone]              Who to contact     If you have questions or need follow up information about today's clinic visit or your schedule please contact [facility] directly at [phone].  Normal or non-critical lab and imaging results will be communicated to you by MyChart, letter or phone within 4 business days after the clinic has received the results. If you do not hear from us within 7 days, please contact the clinic through ApoCellhart or phone. If you have a critical or abnormal lab result, we will notify you by phone as soon as possible.  Submit refill requests through Viptable or call your pharmacy and they will forward the refill request to us. Please allow 3 business days for your refill to be completed.          Additional Information About Your Visit        ApoCellhart Information     Viptable lets you send messages to your doctor, view your test results,  "renew your prescriptions, schedule appointments and more. To sign up, go to [email or web address] . Click on \"Log in\" on the left side of the screen, which will take you to the Welcome page. Then click on \"Sign up Now\" on the right side of the page.     You will be asked to enter the access code listed below, as well as some personal information. Please follow the directions to create your username and password.     Your access code is: [email or web address]  Expires: 5/3/2018  1:02 PM     Your access code will  in 90 days. If you need help or a new code, please call your [facility] clinic or [phone].        Care EveryWhere ID     This is your Care EveryWhere ID. This could be used by other organizations to access your [facility] medical records  [id number]        Your Vitals Were     Pulse Temperature Respirations Pulse Oximetry BMI (Body Mass Index)       70 98.4  F (36.9  C) 18 96% 18.69 kg/m2        Blood Pressure from Last 3 Encounters:   18 115/70   17 130/75   10/09/17 143/83    Weight from Last 3 Encounters:   18 97 lb 4.8 oz (44.1 kg)   17 93 lb 9.6 oz (42.5 kg)   10/09/17 93 lb 14.4 oz (42.6 kg)              Today, you had the following     No orders found for display       Primary Care Provider Office Phone # Fax #    RADHA Ashley [facility] [phone]       [address] [place] [address]        Equal Access to Services     [facility]: Hadii dawood Arguello, waaxda luqadaha, qaybta kaalmarlin rodriguez. So [facility] [phone].    ATENCIÓN: Si habla español, tiene a sadler disposición servicios gratuitos de asistencia lingüística. Llame al [phone].    We comply with applicable federal civil rights laws and [place] laws. We do not discriminate on the basis of race, color, national origin, age, disability, sex, sexual orientation, or gender identity.            Thank you!     Thank you for choosing " [facility]  for your care. Our goal is always to provide you with excellent care. Hearing back from our patients is one way we can continue to improve our services. Please take a few minutes to complete the written survey that you may receive in the mail after your visit with us. Thank you!             Your Updated Medication List - Protect others around you: Learn how to safely use, store and throw away your medicines at www.disposemymeds.org.          This list is accurate as of 2/2/18  1:02 PM.  Always use your most recent med list.                   Brand Name Dispense Instructions for use Diagnosis    acetaminophen 32 mg/mL solution    TYLENOL    300 mL    31.25 mLs (1,000 mg) by Per G Tube route 3 times daily Give 20.31 ml (650mg)    Acute pain of left knee       ARTIFICIAL TEARS 1.4 % ophthalmic solution   Generic drug:  polyvinyl alcohol      Place 2 drops into the right eye as needed for dry eyes        ASPIRIN PO      81 mg by Per G Tube route daily        CYANOCOBALAMIN PO      Take 1,000 mcg by mouth daily        guaiFENesin 20 mg/mL Soln solution    ROBITUSSIN     10 mLs by Per G Tube route every 4 hours as needed for cough        SALINE NASAL SPRAY NA      [place] 2 sprays into both nostrils as needed        sennosides 8.6 MG tablet    SENOKOT     2 tablets by Per G Tube route At Bedtime        SEROQUEL PO      12.5 mg by Per G Tube route daily        traMADol 50 MG tablet    ULTRAM    20 tablet    Take 1 tablet (50 mg) by mouth every 6 hours as needed for pain maximum 4 tablet(s) per day    Acute pain of left knee       Zinc oxide 10 % Crea      Apply topically 2 times daily And PRN

## 2018-02-03 ASSESSMENT — PATIENT HEALTH QUESTIONNAIRE - PHQ9: SUM OF ALL RESPONSES TO PHQ QUESTIONS 1-9: 0

## 2018-02-08 ENCOUNTER — CLINICAL UPDATE (OUTPATIENT)
Dept: PHARMACY | Facility: CLINIC | Age: 83
End: 2018-02-08

## 2018-02-08 NOTE — PROGRESS NOTES
This patient's medication list and chart were reviewed as part of the service provided by [facility].    Assessment/Recommendations:  1. (Pain):  Please consider d'c of prn Tramadol.  Pt with history of seizures, and this med may decrease seizure threshold.  Also may contribute to confusion, CNS se's, increased delirium risk.  2. (Dementia w/ delusions):  Please consider trial d'c of Quetiapine.  Last GDR in June 2017, and appears it was successful.  Monitor target symptoms, and if return, could restart at 12.5mg dose.  Patient is at increased risk of MI/stroke, hypotension and sedation with antipsychotic treatment.        Candi Alston, Pharm.D.,[facility]  Board Certified Geriatric Pharmacist  Medication Therapy Management Pharmacist  [phone]

## 2018-02-09 ENCOUNTER — NURSING HOME VISIT (OUTPATIENT)
Dept: GERIATRICS | Facility: CLINIC | Age: 83
End: 2018-02-09
Payer: COMMERCIAL

## 2018-02-09 ENCOUNTER — TRANSFERRED RECORDS (OUTPATIENT)
Dept: HEALTH INFORMATION MANAGEMENT | Facility: CLINIC | Age: 83
End: 2018-02-09

## 2018-02-09 VITALS
BODY MASS INDEX: 18.69 KG/M2 | HEART RATE: 70 BPM | TEMPERATURE: 98.4 F | RESPIRATION RATE: 18 BRPM | WEIGHT: 97.3 LBS | SYSTOLIC BLOOD PRESSURE: 115 MMHG | OXYGEN SATURATION: 96 % | DIASTOLIC BLOOD PRESSURE: 70 MMHG

## 2018-02-09 DIAGNOSIS — F01.52: ICD-10-CM

## 2018-02-09 DIAGNOSIS — I10 ESSENTIAL HYPERTENSION, BENIGN: ICD-10-CM

## 2018-02-09 DIAGNOSIS — D51.0 PERNICIOUS ANEMIA: ICD-10-CM

## 2018-02-09 DIAGNOSIS — M19.90 OSTEOARTHRITIS, UNSPECIFIED OSTEOARTHRITIS TYPE, UNSPECIFIED SITE: Primary | ICD-10-CM

## 2018-02-09 LAB
BUN SERPL-MCNC: 23 MG/DL (ref 7–24)
CREAT SERPL-MCNC: 0.53 MG/DL (ref 0.55–1.02)
GFR SERPL CREATININE-BSD FRML MDRD: >60 ML/MIN/1.73M2
HEMOGLOBIN: 11.3 GM/DL (ref 12–16)
POTASSIUM SERPL-SCNC: 4.1 MMOL/L (ref 3.5–5.1)
SODIUM SERPL-SCNC: 144 MMOL/L (ref 136–145)

## 2018-02-09 PROCEDURE — 99308 SBSQ NF CARE LOW MDM 20: CPT | Performed by: NURSE PRACTITIONER

## 2018-02-09 NOTE — PROGRESS NOTES
[facility]    Chief Complaint   Patient presents with     Nursing Home Acute       HPI:    Riaz Sim is a 89 year old  (3/11/1925), who is being seen today for an episodic care visit at [facility].    HPI information obtained from: facility chart records, facility staff and [facility] chart review.     Today's concern is:  Osteoarthritis, unspecified osteoarthritis type, unspecified site  Patient with hx of knee arthritis. No recent s/s pain and PRN Tramadol  Has not been used this month. She appears comfortable with scheduled Tylenol.     Dementia, vascular, with delusions (H)  Patient with dementia, past hx of delusions/hallucinations. Has tolerated a recent dose reduction of Seroquel. NP left  for family re: further reduction attempts - no call back thus far. Would like to change Seroquel to PRN, monitor for any increase in symptoms.     Essential hypertension, benign  Chronic, stable. Renal function check today WNL.   BUN 23. Cr 0.52, Na 144 and K 4.1    Pernicious anemia  By history. Hgb down to 11.3 today but no s/s bleeding or bruising. Monitor per routine.   Lab Results   Component Value Date    HGB 13.0 08/02/2017    HGB 13.1 02/08/2017        REVIEW OF SYSTEMS:  Unobtainable secondary to cognitive impairment or aphasia.    OBJECTIVE:   /70  Pulse 70  Temp 98.4  F (36.9  C)  Resp 18  Wt 97 lb 4.8 oz (44.1 kg)  SpO2 96%  BMI 18.69 kg/m2  GENERAL APPEARANCE:  Asleep in bed, in no distress  On room air, no cough  No LE edema.   No facial asymmetry.   Feeding tube in place and formula infusing.     ASSESSMENT/PLAN:   Diagnosis Comments   1. Osteoarthritis, unspecified osteoarthritis type, unspecified site  Appears resolved/managed with tylenol. No recent tramadol use - will stop    2. Dementia, vascular, with delusions (H)  Chronic, appears to have progressed beyond agitation and is no longer as alert as in the past. Will attempt to change Seroquel to 12.5 mg daily  PRN, let provider know if ineffective   3. Essential hypertension, benign  Chronic, renal function unchanged stable. Monitor PRN.    4. Pernicious anemia  Hgb down somewhat from last check but no s/s bleeding or bruising. F/U with next routine check.        Orders:  DC PRN tramadol - not used  Change Seroquel to 12.5 mg Per GT daily PRN agitation, delusions. Staff to update family to this change - let provider know if family/staff notice any increase in symptoms.     Electronically signed by:  RADHA Marquez CNP

## 2018-02-09 NOTE — MR AVS SNAPSHOT
"              After Visit Summary   2018    Riaz Sim    MRN: [id number]           Patient Information     Date Of Birth          3/11/1925        Visit Information        Provider Department      2018 12:00 PM Kyung Lr APRN CNP [facility]        Today's Diagnoses     Osteoarthritis, unspecified osteoarthritis type, unspecified site    -  1    Dementia, vascular, with delusions (H)        Essential hypertension, benign        Pernicious anemia           Follow-ups after your visit        Who to contact     If you have questions or need follow up information about today's clinic visit or your schedule please contact [facility] directly at [phone].  Normal or non-critical lab and imaging results will be communicated to you by MyChart, letter or phone within 4 business days after the clinic has received the results. If you do not hear from us within 7 days, please contact the clinic through MyChart or phone. If you have a critical or abnormal lab result, we will notify you by phone as soon as possible.  Submit refill requests through [employer] or call your pharmacy and they will forward the refill request to us. Please allow 3 business days for your refill to be completed.          Additional Information About Your Visit        MyChart Information     [employer] lets you send messages to your doctor, view your test results, renew your prescriptions, schedule appointments and more. To sign up, go to [email or web address] . Click on \"Log in\" on the left side of the screen, which will take you to the Welcome page. Then click on \"Sign up Now\" on the right side of the page.     You will be asked to enter the access code listed below, as well as some personal information. Please follow the directions to create your username and password.     Your access code is: [email or web address]  Expires: 5/3/2018  1:02 PM     Your access code will  in 90 days. If you need help or " a new code, please call your [place] clinic or [phone].        Care EveryWhere ID     This is your Care EveryWhere ID. This could be used by other organizations to access your [facility] [place] medical records  [id number]        Your Vitals Were     Pulse Temperature Respirations Pulse Oximetry BMI (Body Mass Index)       70 98.4  F (36.9  C) 18 96% 18.69 kg/m2        Blood Pressure from Last 3 Encounters:   02/09/18 115/70   02/02/18 115/70   12/06/17 130/75    Weight from Last 3 Encounters:   02/09/18 97 lb 4.8 oz (44.1 kg)   02/02/18 97 lb 4.8 oz (44.1 kg)   12/06/17 93 lb 9.6 oz (42.5 kg)              Today, you had the following     No orders found for display         Today's Medication Changes          These changes are accurate as of 2/9/18  1:36 PM.  If you have any questions, ask your nurse or doctor.               Stop taking these medicines if you haven't already. Please contact your care team if you have questions.     traMADol 50 MG tablet   Commonly known as:  ULTRAM                    Primary Care Provider Office Phone # Fax #    Kyung RADHA [facility] [phone]       [address] [place] [address]        Equal Access to Services     AIYANA FIELD : Hadii daowod biswas hadasho Soomaali, waaxda luqadaha, qaybta kaalmada adeegyada, marlin staton . So [facility] [phone].    ATENCIÓN: Si habla español, tiene a sadler disposición servicios gratuitos de asistencia lingüística. Llame al [phone].    We comply with applicable federal civil rights laws and [place] laws. We do not discriminate on the basis of race, color, national origin, age, disability, sex, sexual orientation, or gender identity.            Thank you!     Thank you for choosing [facility]  for your care. Our goal is always to provide you with excellent care. Hearing back from our patients is one way we can continue to improve our services. Please take a few minutes to complete  the written survey that you may receive in the mail after your visit with us. Thank you!             Your Updated Medication List - Protect others around you: Learn how to safely use, store and throw away your medicines at www.disposemymeds.org.          This list is accurate as of 2/9/18  1:36 PM.  Always use your most recent med list.                   Brand Name Dispense Instructions for use Diagnosis    acetaminophen 32 mg/mL solution    TYLENOL    300 mL    31.25 mLs (1,000 mg) by Per G Tube route 3 times daily Give 20.31 ml (650mg)    Acute pain of left knee       ARTIFICIAL TEARS 1.4 % ophthalmic solution   Generic drug:  polyvinyl alcohol      Place 2 drops into the right eye as needed for dry eyes        ASPIRIN PO      81 mg by Per G Tube route daily        CYANOCOBALAMIN PO      Take 1,000 mcg by mouth daily        guaiFENesin 20 mg/mL Soln solution    ROBITUSSIN     10 mLs by Per G Tube route every 4 hours as needed for cough        SALINE NASAL SPRAY NA      [place] 2 sprays into both nostrils as needed        sennosides 8.6 MG tablet    SENOKOT     2 tablets by Per G Tube route At Bedtime        SEROQUEL PO      12.5 mg by Per G Tube route daily as needed        Zinc oxide 10 % Crea      Apply topically 2 times daily And PRN

## 2018-03-05 ENCOUNTER — APPOINTMENT (OUTPATIENT)
Dept: INTERVENTIONAL RADIOLOGY/VASCULAR | Facility: CLINIC | Age: 83
End: 2018-03-05
Attending: NURSE PRACTITIONER
Payer: COMMERCIAL

## 2018-03-05 ENCOUNTER — HOSPITAL ENCOUNTER (OUTPATIENT)
Facility: CLINIC | Age: 83
Discharge: NURSING, REHAB OR LONG-TERM CARE | End: 2018-03-05
Admitting: RADIOLOGY
Payer: COMMERCIAL

## 2018-03-05 ENCOUNTER — HOSPITAL ENCOUNTER (OUTPATIENT)
Facility: CLINIC | Age: 83
End: 2018-03-05
Admitting: PEDIATRICS

## 2018-03-05 ENCOUNTER — TELEPHONE (OUTPATIENT)
Dept: INTERVENTIONAL RADIOLOGY/VASCULAR | Facility: CLINIC | Age: 83
End: 2018-03-05

## 2018-03-05 VITALS
DIASTOLIC BLOOD PRESSURE: 86 MMHG | OXYGEN SATURATION: 97 % | SYSTOLIC BLOOD PRESSURE: 146 MMHG | RESPIRATION RATE: 20 BRPM | HEART RATE: 79 BPM

## 2018-03-05 DIAGNOSIS — R13.10 DYSPHAGIA: ICD-10-CM

## 2018-03-05 PROCEDURE — 27210742 ZZH CATH CR1

## 2018-03-05 PROCEDURE — 27210905 ZZH KIT CR7

## 2018-03-05 PROCEDURE — 49450 REPLACE G/C TUBE PERC: CPT

## 2018-03-05 PROCEDURE — C1769 GUIDE WIRE: HCPCS

## 2018-03-05 PROCEDURE — 27210915 ZZ H TUBE GASTRO CR4

## 2018-03-05 PROCEDURE — 40000854 ZZH STATISTIC SIMPLE TUBE INSERTION/CHARGE, PORT, CATH, FISTULOGRAM

## 2018-03-05 NOTE — PROGRESS NOTES
Return to [facility] 10 at 1445.  Per report new tube in place with abd binder on.  No sedation given, no medication given.  Transport called for ride to [facility].  Awaiting transport, appears comfortable.  1513 Pt transported via cart in stable condition.

## 2018-03-05 NOTE — TELEPHONE ENCOUNTER
[facility] Interventional Radiology     IR received a request from [facility] to replace Riaz Sim's gastrostomy tube which fell out.     Phone consent was obtained from grand daughter Zully Fischer after explaining the procedure, risks and benefits and consent is in IR.     Estephania Angelatschuh CNP ([phone])

## 2018-03-05 NOTE — PROGRESS NOTES
Pt arrives today via [facility].  No spoken English.  Call placed to [facility] for last dose of medications given.  List of meds only in paperwork sent with pt.  IR notified pt has arrived.   notified pt has arrived.

## 2018-03-19 ENCOUNTER — NURSING HOME VISIT (OUTPATIENT)
Dept: GERIATRICS | Facility: CLINIC | Age: 83
End: 2018-03-19
Payer: COMMERCIAL

## 2018-03-19 VITALS
OXYGEN SATURATION: 96 % | WEIGHT: 97.4 LBS | BODY MASS INDEX: 18.71 KG/M2 | DIASTOLIC BLOOD PRESSURE: 72 MMHG | HEART RATE: 75 BPM | SYSTOLIC BLOOD PRESSURE: 135 MMHG | TEMPERATURE: 98.4 F | RESPIRATION RATE: 18 BRPM

## 2018-03-19 DIAGNOSIS — F22 DELUSIONAL DISORDER (H): Primary | ICD-10-CM

## 2018-03-19 DIAGNOSIS — F01.52: ICD-10-CM

## 2018-03-19 PROCEDURE — 99308 SBSQ NF CARE LOW MDM 20: CPT | Performed by: NURSE PRACTITIONER

## 2018-03-19 NOTE — PROGRESS NOTES
[facility]    Chief Complaint   Patient presents with     Nursing Home Acute       HPI:    Riaz Sim is a 89 year old  (3/11/1925), who is being seen today for an episodic care visit at [facility].    HPI information obtained from: facility chart records, facility staff and [facility] chart review. Today's concern is:  1. Delusional disorder (H)    2. Dementia, vascular, with delusions (H)      Patient continues on PRN seroquel and it has been used twice in the past week.  Patient is cantonese speaking and so most reports of hallucinations and delusions have come from family. They feel she becomes upset and sees things such as ghosts and witches. She continues to need PRN seroquel for these symptoms of agitation. No side effects from use.     REVIEW OF SYSTEMS:  Unobtainable secondary to aphasia.     OBJECTIVE:   /72  Pulse 75  Temp 98.4  F (36.9  C)  Resp 18  Wt 97 lb 6.4 oz (44.2 kg)  SpO2 96%  BMI 18.71 kg/m2  GENERAL APPEARANCE:  Sleepy female, resting in bed.   On room air, no dyspnea.   No facial asymmetry. Knees without redness or warmth.     ASSESSMENT/PLAN:     Delusional disorder (H)  Dementia, vascular, with delusions (H)   Chronic issues, continues to require Seroquel at this time.     Continue PRN Seroquel without changes x 14 more days due to psychosis symptoms. If PRN doses used will most likely re-evaluate and schedule daily. If no PRN doses used - will DC med.       Electronically signed by:  RADHA Marquez CNP

## 2018-03-19 NOTE — MR AVS SNAPSHOT
"              After Visit Summary   3/19/2018    Riaz Sim    MRN: [id number]           Patient Information     Date Of Birth          3/11/1925        Visit Information        Provider Department      3/19/2018 3:00 PM Kyung Lr APRN CNP [facility]        Today's Diagnoses     Delusional disorder (H)    -  1    Dementia, vascular, with delusions (H)           Follow-ups after your visit        Your next 10 appointments already scheduled     Mar 19, 2018  3:00 PM CDT   Nursing Home with RADHA Ashley CNP   [facility]    [address] [place] [id number] [phone]              Who to contact     If you have questions or need follow up information about today's clinic visit or your schedule please contact [facility] directly at [phone].  Normal or non-critical lab and imaging results will be communicated to you by MyChart, letter or phone within 4 business days after the clinic has received the results. If you do not hear from us within 7 days, please contact the clinic through MyChart or phone. If you have a critical or abnormal lab result, we will notify you by phone as soon as possible.  Submit refill requests through [employer] or call your pharmacy and they will forward the refill request to us. Please allow 3 business days for your refill to be completed.          Additional Information About Your Visit        MyChart Information     [employer] lets you send messages to your doctor, view your test results, renew your prescriptions, schedule appointments and more. To sign up, go to [email or web address] . Click on \"Log in\" on the left side of the screen, which will take you to the Welcome page. Then click on \"Sign up Now\" on the right side of the page.     You will be asked to enter the access code listed below, as well as some personal information. Please follow the directions to create " your username and password.     Your access code is: [email or web address]  Expires: 5/3/2018  2:02 PM     Your access code will  in 90 days. If you need help or a new code, please call your [place] clinic or [phone].        Care EveryWhere ID     This is your Care EveryWhere ID. This could be used by other organizations to access your [place] medical records  [id number]        Your Vitals Were     Pulse Temperature Respirations Pulse Oximetry BMI (Body Mass Index)       75 98.4  F (36.9  C) 18 96% 18.71 kg/m2        Blood Pressure from Last 3 Encounters:   18 135/72   18 146/86   18 115/70    Weight from Last 3 Encounters:   18 97 lb 6.4 oz (44.2 kg)   18 97 lb 4.8 oz (44.1 kg)   18 97 lb 4.8 oz (44.1 kg)              Today, you had the following     No orders found for display       Primary Care Provider Office Phone # Fax #    Kyung ARINA Lr, RADHA [facility] [phone]       [address] [place] [address]        Equal Access to Services     ZAKI FIELD : Hadii dawood ku hadasho Soomaali, waaxda luqadaha, qaybta kaalmada adeegyada, marlin staton . So [facility] [phone].    ATENCIÓN: Si habla español, tiene a sadler disposición servicios gratuitos de asistencia lingüística. Llmaurice al [phone].    We comply with applicable federal civil rights laws and [place] laws. We do not discriminate on the basis of race, color, national origin, age, disability, sex, sexual orientation, or gender identity.            Thank you!     Thank you for choosing [facility]  for your care. Our goal is always to provide you with excellent care. Hearing back from our patients is one way we can continue to improve our services. Please take a few minutes to complete the written survey that you may receive in the mail after your visit with us. Thank you!             Your Updated Medication List - Protect others around you: Learn how to safely  use, store and throw away your medicines at www.disposemymeds.org.          This list is accurate as of 3/19/18 12:08 PM.  Always use your most recent med list.                   Brand Name Dispense Instructions for use Diagnosis    acetaminophen 32 mg/mL solution    TYLENOL    300 mL    31.25 mLs (1,000 mg) by Per G Tube route 3 times daily Give 20.31 ml (650mg)    Acute pain of left knee       ARTIFICIAL TEARS 1.4 % ophthalmic solution   Generic drug:  polyvinyl alcohol      Place 2 drops into the right eye as needed for dry eyes        ASPIRIN PO      81 mg by Per G Tube route daily        CYANOCOBALAMIN PO      Take 1,000 mcg by mouth daily        diclofenac 1 % Gel topical gel    VOLTAREN     Place onto the skin 2 times daily AND PRN        guaiFENesin 20 mg/mL Soln solution    ROBITUSSIN     10 mLs by Per G Tube route every 4 hours as needed for cough        SALINE NASAL SPRAY NA      [place] 2 sprays into both nostrils as needed        sennosides 8.6 MG tablet    SENOKOT     2 tablets by Per G Tube route At Bedtime        SEROQUEL PO      12.5 mg by Per G Tube route daily as needed        Zinc oxide 10 % Crea      Apply topically 2 times daily And PRN

## 2018-09-17 ENCOUNTER — HOSPITAL ENCOUNTER (EMERGENCY)
Facility: CLINIC | Age: 83
Discharge: HOME | End: 2018-09-18
Attending: EMERGENCY MEDICINE | Admitting: EMERGENCY MEDICINE
Payer: COMMERCIAL

## 2018-09-17 DIAGNOSIS — T85.528A GASTROJEJUNOSTOMY TUBE DISLODGEMENT: ICD-10-CM

## 2018-09-17 PROCEDURE — 99282 EMERGENCY DEPT VISIT SF MDM: CPT

## 2018-09-17 NOTE — ED AVS SNAPSHOT
Emergency Department    [address] [place] [address]    Phone:  [phone]    Fax:  [phone]                                       Riaz Sim   MRN: [id number]    Department:   Emergency Department   Date of Visit:  9/17/2018           After Visit Summary Signature Page     I have received my discharge instructions, and my questions have been answered. I have discussed any challenges I see with this plan with the nurse or doctor.    ..........................................................................................................................................  Patient/Patient Representative Signature      ..........................................................................................................................................  Patient Representative Print Name and Relationship to Patient    ..................................................               ................................................  Date                                   Time    ..........................................................................................................................................  Reviewed by Signature/Title    ...................................................              ..............................................  Date                                               Time          22EPIC Rev 08/18

## 2018-09-17 NOTE — ED AVS SNAPSHOT
Emergency Department    [address] [place] [address]    Phone:  [phone]    Fax:  [phone]                                       Riaz Sim   MRN: [id number]    Department:   Emergency Department   Date of Visit:  9/17/2018           Patient Information     Date Of Birth          3/11/1925        Your diagnoses for this visit were:     Gastrojejunostomy tube dislodgement (H)        You were seen by Zenon Ramon MD.      Follow-up Information     Please follow up.    Why:  with interventional radiology later today        Discharge Instructions       If You Have a Drainage Bag  G-tubes, J-tubes and GJ-tubes  Washing your bag  You may wish to wash your bag with soap and water once a week. Throw the bag out after one month.  1. Clean your work area with alcohol (or soap and water) and a paper towel. Wash your hands with soap and water.  2. Place these items on your clean work area:  ? Another drainage bag, or a cap for your tube  ? Small funnel  ? Liquid dish soap  1. Gently twist apart the tubing. Do not use your fingernails. Nails often carry germs.  2. Place the cap on your tube, or connect the tube to another drainage bag.  3. Empty the drainage from the used bag.  4. Fill the bag with cool tap water. A small funnel will help direct the stream of water.  5. Drain and fill the bag again, adding a couple drops of dish soap. Gently squeeze the bag several times to clean the inside. Drain the water into the toilet. Rinse the bag well with tap water.  6. If you have a cap on your tube, remove it. Connect the clean bag (or new bag) to your tube.  Flushing your tube  Your nurse will show you how to flush your tube.   If someone else will flush it for you, he or she should wear gloves.  Flush with _________ ml water. Do this one to two times each day.   If the tube gets plugged:     Attach a syringe (30 ml or larger) filled with warm water. Gently push and pull the plunger.    If  the tube clears, flush gently with 30 to 40 ml warm water until it flushes easily.  If your tube comes out or you cannot clear your tube, please call your radiology department. Do not go to the emergency room.    [facility]: [phone].    [facility]: [phone]    [facility]: [phone]    [facility] [place]: [phone]    [facility]  ? [facility]: [phone]  ? [facility]: [phone]  For informational purposes only. Not to replace the advice of your health care provider.   Copyright   2009 [facility]. All rights reserved. [employer] 451238 - REV 12/15.      24 Hour Appointment Hotline       To make an appointment at any [place] clinic, call [phone] ([phone]). If you don't have a family doctor or clinic, we will help you find one. [place] clinics are conveniently located to serve the needs of you and your family.             Review of your medicines      Our records show that you are taking the medicines listed below. If these are incorrect, please call your family doctor or clinic.        Dose / Directions Last dose taken    acetaminophen 32 mg/mL solution   Commonly known as:  TYLENOL   Dose:  1000 mg   Quantity:  300 mL        31.25 mLs (1,000 mg) by Per G Tube route 3 times daily Give 20.31 ml (650mg)   Refills:  0        ARTIFICIAL TEARS 1.4 % ophthalmic solution   Dose:  2 drop   Generic drug:  polyvinyl alcohol        Place 2 drops into the right eye as needed for dry eyes   Refills:  0        ASPIRIN PO   Dose:  81 mg        81 mg by Per G Tube route daily   Refills:  0        CYANOCOBALAMIN PO   Dose:  1000 mcg        Take 1,000 mcg by mouth daily   Refills:  0        diclofenac 1 % Gel topical gel   Commonly known as:  VOLTAREN        Place onto the skin 2 times daily AND PRN   Refills:  0        guaiFENesin 20 mg/mL Soln solution   Commonly known as:   ROBITUSSIN   Dose:  10 mL        10 mLs by Per G Tube route every 4 hours as needed for cough   Refills:  0        SALINE NASAL SPRAY NA   Dose:  2 spray        Spray 2 sprays into both nostrils as needed   Refills:  0        sennosides 8.6 MG tablet   Commonly known as:  SENOKOT   Dose:  2 tablet        2 tablets by Per G Tube route At Bedtime   Refills:  0        SEROQUEL PO   Dose:  12.5 mg        12.5 mg by Per G Tube route daily as needed   Refills:  0        Zinc oxide 10 % Crea        Apply topically 2 times daily And PRN   Refills:  0                Orders Needing Specimen Collection     None      Pending Results     No orders found for last 3 day(s).            Pending Culture Results     No orders found for last 3 day(s).            Pending Results Instructions     If you had any lab results that were not finalized at the time of your Discharge, you can call the ED Lab Result RN at [phone]. You will be contacted by this team for any positive Lab results or changes in treatment. The nurses are available 7 days a week from 10A to 6:30P.  You can leave a message 24 hours per day and they will return your call.        Test Results From Your Hospital Stay               Clinical Quality Measure: Blood Pressure Screening     Your blood pressure was checked while you were in the emergency department today. The last reading we obtained was  BP: 97/48 . Please read the guidelines below about what these numbers mean and what you should do about them.  If your systolic blood pressure (the top number) is less than 120 and your diastolic blood pressure (the bottom number) is less than 80, then your blood pressure is normal. There is nothing more that you need to do about it.  If your systolic blood pressure (the top number) is 120-139 or your diastolic blood pressure (the bottom number) is 80-89, your blood pressure may be higher than it should be. You should have your blood pressure rechecked within a year by a  "primary care provider.  If your systolic blood pressure (the top number) is 140 or greater or your diastolic blood pressure (the bottom number) is 90 or greater, you may have high blood pressure. High blood pressure is treatable, but if left untreated over time it can put you at risk for heart attack, stroke, or kidney failure. You should have your blood pressure rechecked by a primary care provider within the next 4 weeks.  If your provider in the emergency department today gave you specific instructions to follow-up with your doctor or provider even sooner than that, you should follow that instruction and not wait for up to 4 weeks for your follow-up visit.        Thank you for choosing [facility]       Thank you for choosing [facility] for your care. Our goal is always to provide you with excellent care. Hearing back from our patients is one way we can continue to improve our services. Please take a few minutes to complete the written survey that you may receive in the mail after you visit with us. Thank you!        [facility] Information     [facility] lets you send messages to your doctor, view your test results, renew your prescriptions, schedule appointments and more. To sign up, go to [email or web address] . Click on \"Log in\" on the left side of the screen, which will take you to the Welcome page. Then click on \"Sign up Now\" on the right side of the page.     You will be asked to enter the access code listed below, as well as some personal information. Please follow the directions to create your username and password.     Your access code is: [email or web address]  Expires: 2018  1:27 AM     Your access code will  in 90 days. If you need help or a new code, please call your [facility] clinic or [phone].        Care EveryWhere ID     This is your Care EveryWhere ID. This could be used by other organizations to access your [facility] medical records  [id number]        Equal Access to Services     AIYANA FIELD AH: " Hadii dawood Arguello, waalejandrada laurence, qachelleta kaalmarlin rodriguez. So [facility] [phone].    ATENCIÓN: Si habla español, tiene a sadler disposición servicios gratuitos de asistencia lingüística. Llame al [phone].    We comply with applicable federal civil rights laws and [place] laws. We do not discriminate on the basis of race, color, national origin, age, disability, sex, sexual orientation, or gender identity.            After Visit Summary       This is your record. Keep this with you and show to your community pharmacist(s) and doctor(s) at your next visit.

## 2018-09-18 VITALS
DIASTOLIC BLOOD PRESSURE: 48 MMHG | SYSTOLIC BLOOD PRESSURE: 97 MMHG | RESPIRATION RATE: 18 BRPM | TEMPERATURE: 98.1 F | OXYGEN SATURATION: 92 %

## 2018-09-18 NOTE — ED PROVIDER NOTES
History     Chief Complaint:  G Tube Check    The history is provided by the retirement. The history is limited by a language barrier.      Riaz Sim is a 89 year old female diabetic with a history of seizures who presents for evaluation of a G tube check after it fell out while at her nursing home. Due to a language barrier, minimal history was provided by the patient. Care facility staff state that the patient has had this G tube in place for several years. Patient is unable to deny other complaint at this time.    Allergies:  No known drug allergies   No Known Allergies     Medications:    Aspirin  Robitussin  Seroquel  Senokot    Past Medical History:    Dementia  Hemiplegia  Dysphagia  Seizure disorder  Senile osteoporosis  Pernicious anemia  Benign essential hypertension  ACP  Slow transit constipation  Insomnia  CVA  Diabetes  Osteoporosis    Past Surgical History:    Left wrist surgery    Family History:    History reviewed. No pertinent family history.      Social History:  Presents via EMS from care facility   Tobacco use: Never smoker   Alcohol use: No  PCP: No primary care provider on file.    Marital Status:       Review of Systems   Unable to perform ROS: Other     Physical Exam     Patient Vitals for the past 24 hrs:   BP Temp Temp src Heart Rate Resp SpO2   09/18/18 0002 97/48 98.1  F (36.7  C) Temporal 63 18 92 %        Physical Exam  General: No distress.   Head: No signs of trauma.   Mouth/Throat: Oropharynx moist.   Eyes: Conjunctivae are normal. Pupils are equal..   Neck: Normal range of motion.    Resp:No respiratory distress.   GI: G tube placement, left abdomen.  MSK: Normal range of motion. No obvious deformity.  Neuro: The patient is alert and interactive. TURNER. Speech normal. GCS 15  Skin: No lesion or sign of trauma noted.   Psych: normal mood and affect. behavior is normal.      Emergency Department Course     Emergency Department Course:  Past medical records, nursing  notes, and vitals reviewed.  1220: I performed an exam of the patient and obtained history, as documented above.    1225: I rechecked the patient. Attempted to place a 14, then a 10 catheter into the G tube without success.    1243: I rechecked the patient. Attempted to place a 10 Polish catheter into the G tube was successful. Pulled back what appeared to be stomach content.    0054: I rechecked the patient. Findings and plan explained to the Patient. Patient discharged to her care facility with instructions regarding supportive care, medications, and reasons to return. The importance of close follow-up was reviewed.      Impression & Plan      Medical Decision Making:  This patient is presenting to the emergency department after removing her gastric tube.  The patient is demented and this was an unintentional act as far as I can tell.  We attempted to place a Cox catheter to maintain the wound tract.  I was initially  unsuccessful but eventually I was able to pass a silicone 12 Spanish catheter.  This catheter will maintain the surgical wound tract but the patient will need to return to interventional radiology later today for placement of a G-tube.    Diagnosis:    ICD-10-CM   1. Gastrojejunostomy tube dislodgement (H) Z43.4       Disposition:  Discharged to home with plan as outlined.      Scribe Disclosure:  I, Raymond Guerrero, am serving as a scribe at 12:25 AM on 9/18/2018 to document services personally performed by Zenon Ramon MD based on my observations and the provider's statements to me.  7/24/2018    EMERGENCY DEPARTMENT      Zenon Ramon MD  09/18/18 7464

## 2018-09-18 NOTE — ED NOTES
Bed: ED30  Expected date:   Expected time:   Means of arrival:   Comments:  KIRK STEIN.  Pulled G Tube.

## 2018-09-18 NOTE — DISCHARGE INSTRUCTIONS
If You Have a Drainage Bag  G-tubes, J-tubes and GJ-tubes  Washing your bag  You may wish to wash your bag with soap and water once a week. Throw the bag out after one month.  1. Clean your work area with alcohol (or soap and water) and a paper towel. Wash your hands with soap and water.  2. Place these items on your clean work area:  ? Another drainage bag, or a cap for your tube  ? Small funnel  ? Liquid dish soap  1. Gently twist apart the tubing. Do not use your fingernails. Nails often carry germs.  2. Place the cap on your tube, or connect the tube to another drainage bag.  3. Empty the drainage from the used bag.  4. Fill the bag with cool tap water. A small funnel will help direct the stream of water.  5. Drain and fill the bag again, adding a couple drops of dish soap. Gently squeeze the bag several times to clean the inside. Drain the water into the toilet. Rinse the bag well with tap water.  6. If you have a cap on your tube, remove it. Connect the clean bag (or new bag) to your tube.  Flushing your tube  Your nurse will show you how to flush your tube.   If someone else will flush it for you, he or she should wear gloves.  Flush with _________ ml water. Do this one to two times each day.   If the tube gets plugged:     Attach a syringe (30 ml or larger) filled with warm water. Gently push and pull the plunger.    If the tube clears, flush gently with 30 to 40 ml warm water until it flushes easily.  If your tube comes out or you cannot clear your tube, please call your radiology department. Do not go to the emergency room.    [facility]: [phone].    [facility]: [phone]    [facility]: [phone]    [facility]: [phone]    [facility]  ? [facility]: [phone]  ? [facility]: [phone]  For informational purposes only. Not to replace the advice of your health care provider.    Copyright   2009 [facility]. All rights reserved. [employer] 068468 - REV 12/15.

## 2018-09-19 ENCOUNTER — HOSPITAL ENCOUNTER (EMERGENCY)
Facility: CLINIC | Age: 83
Discharge: HOME | End: 2018-09-19
Attending: EMERGENCY MEDICINE | Admitting: EMERGENCY MEDICINE
Payer: COMMERCIAL

## 2018-09-19 VITALS
SYSTOLIC BLOOD PRESSURE: 138 MMHG | RESPIRATION RATE: 16 BRPM | HEART RATE: 82 BPM | OXYGEN SATURATION: 96 % | DIASTOLIC BLOOD PRESSURE: 73 MMHG | TEMPERATURE: 98 F

## 2018-09-19 DIAGNOSIS — T85.528A GASTROJEJUNOSTOMY TUBE DISLODGEMENT: ICD-10-CM

## 2018-09-19 PROCEDURE — 27210915 ZZ H TUBE GASTRO CR4

## 2018-09-19 PROCEDURE — 99283 EMERGENCY DEPT VISIT LOW MDM: CPT

## 2018-09-19 NOTE — ED AVS SNAPSHOT
Emergency Department    [address] [place] [address]    Phone:  [phone]    Fax:  [phone]                                       Riaz Sim   MRN: [id number]    Department:   Emergency Department   Date of Visit:  9/19/2018           After Visit Summary Signature Page     I have received my discharge instructions, and my questions have been answered. I have discussed any challenges I see with this plan with the nurse or doctor.    ..........................................................................................................................................  Patient/Patient Representative Signature      ..........................................................................................................................................  Patient Representative Print Name and Relationship to Patient    ..................................................               ................................................  Date                                   Time    ..........................................................................................................................................  Reviewed by Signature/Title    ...................................................              ..............................................  Date                                               Time          22EPIC Rev 08/18

## 2018-09-19 NOTE — ED PROVIDER NOTES
History     Chief Complaint:  G-tube placement     HPI   Limited due to language barrier; history obtained from medics.  Riaz Sim is a 89 year old female with a history of dementia with latent dysphagia with G-tube in situ who presents via EMS for evaluation of G-tube problem. The patient was seen here two days ago for similar G-tube dislodgement at which time a 12 Fr catheter was placed successfully, and she was recommended to follow up with IR for formal G-tube placement (see note by Dr. Ramon on 9/17/18 for further details). The patient apparently did not follow up with IR. At some point in the last two days her G-tube came and/or was pulled out at her nursing facility. Exact time frame unclear. The patient herself voices no complaints.      Allergies:  No Known Allergies     Medications:    Aspirin  Seroquel  Senna      Past Medical History:    Dementia, vascular, with delusions  CVA  Hemiplegia, late effect  Dysphagia with G-tube feedings  Seizure disorder  Senile osteoporosis  pernicious anemia   Hypertension   DM    Past Surgical History:    G-tube placement    Family History:    History reviewed. No pertinent family history.      Social History:  The patient denies tobacco or alcohol use.   Marital Status:   [5]  Speaks Cantonese      Review of Systems   Unable to perform ROS: Dementia       Physical Exam     Patient Vitals for the past 24 hrs:   BP Temp Temp src Pulse Resp SpO2   09/19/18 1327 - - - 82 - -   09/19/18 1317 138/73 - - - - -   09/19/18 0948 111/53 98  F (36.7  C) Temporal 83 16 96 %      Physical Exam    Eye:  Pupils are equal, round, and reactive.  Extraocular movements intact.    ENT:  No rhinorrhea.  Moist mucus membranes.  Normal tongue and tonsil.    Cardiac:  Regular rate and rhythm.  No murmurs, gallops, or rubs.    Pulmonary:  Clear to auscultation bilaterally.  No wheezes, rales, or rhonchi.    Abdomen:  evidence of missing G-tube orifice in the left upper quadrant  without drainage or tenderness.  Positive bowel sounds.  Abdomen is soft and non-distended, without focal tenderness.    Musculoskeletal:  Normal movement of all extremities without evidence for deficit.    Skin:  Warm and dry without rashes.    Neurologic:  The patient has severe extremity contractures, presumed to be at baseline     Psychiatric:  Difficult to assess due to language barrier though patient is bright and alert.       Emergency Department Course   Procedure:  Procedure Note:      Procedure:  Replacement of G-tube  Physician:  Trierweiler, Chad A, MD   Indications for Procedure:  Previous feeding tube was inadvertently pulled out  Description of Procedure: Informed verbal consent obtained.  Site correctly identified.  The area was prepped in usual sterile manner.  The feeding tube site was patent.  The feeding tube balloon was then tested and held saline.  It was inserted using gentle pressure and slid into stomach easily.  Balloon was inflated with saline and there was no pain noted by patient.  Stomach contents could be easily aspirated and placement confirmed in position.  They tolerated the procedure well, no complications.  Tube may be used immediately.      Emergency Department Course:  Past medical records, nursing notes, and vitals reviewed.   0955: I performed an exam of the patient as documented above.      1000: Successfully placed a size 12 Danish silicone catheter to patient's gastrojejunostomy site.    1006: Discussed the patient with Dr. Wilson of IR, who reviewed patient's chart and will plan to place a G-tube later today.  Order placed for IR gastrostomy tube change pending patient's consent.    1040: contacted patient's daughter-in-law, who requested we contact patient's granddaughter. Voicemail left for granddaughter.      Cantonese  paged; ETA 1143.    1300:  still has not arrived.     1342: Called patient's daughter-in-law and granddaughter once again,  neither of whom answered. Voicemails left, await callback.    1345:  present but apparently they speak different dialects.     1400: I rechecked patient. Previous catheter removed. G-tube placed as above successfully using 12 Korean G-tube.    I rechecked the patient. Findings and plan explained to the Patient. Patient discharged home via EMS with instructions regarding supportive care, medications, and reasons to return. The importance of close follow-up was reviewed.         Impression & Plan    Medical Decision Making:  Riaz Sim is a 89 year old female who presents with a dislodgment of her G-tube.  The same thing occurred approximately 2 days ago when she had a replacement Cox catheter put in place, but the patient advised to follow-up with interventional radiology to have a G-tube replacement performed.  This was never done.  It is not exactly clear how her Cox catheter fell out of the G-tube location, though I was able to quickly replace the opening with another catheter of the same size.  At this point, multiple attempts were made to reach family.  I initially spoke with her first emergent contact, listed as her daughter-in-law, who immediately asked me to instead call her granddaughter, presumably because of a language barrier.  The granddaughter was called twice and did not return my calls.  The daughter-in-law was then called back and she also did not answer her phone or return my call.  Eventually, an  was at the bedside.  Unfortunately, she notes that she does not speak the exact same dialect of Cantonese, but they were able to communicate to some degree.  While the patient has dementia, when asked whether she would like to have her G-tube replaced for continued tube feedings, she replied in the affirmative.  I was able to obtain a G-tube in place this myself at the bedside as noted above.  The patient tolerated this well and I have appropriate gastric contents on  pullback of the tube, confirming placement.  With this, there are no other issues at play and I feel the patient is safe to be returned to her nursing home.    Diagnosis:    ICD-10-CM    1. Gastrojejunostomy tube dislodgement (H) Z43.4        Disposition:   discharged to home     Scribe Disclosure:  I, Vince Polo, am serving as a scribe at 9:46 AM on 9/19/2018 to document services personally performed by Trierweiler, Chad A, MD based on my observations and the provider's statements to me.    Vince Polo  9/19/2018   EMERGENCY DEPARTMENT      Trierweiler, Chad A, MD  09/19/18 8701

## 2018-09-19 NOTE — ED NOTES
Bed: ED24  Expected date:   Expected time:   Means of arrival:   Comments:  E2  89 F feeding tube fell out  0939

## 2018-09-19 NOTE — ED AVS SNAPSHOT
Emergency Department    [address] [facility] [address]    Phone:  [phone]    Fax:  [phone]                                       Riaz Sim   MRN: [id number]    Department:   Emergency Department   Date of Visit:  9/19/2018           Patient Information     Date Of Birth          3/11/1925        Your diagnoses for this visit were:     Gastrojejunostomy tube dislodgement (H)        You were seen by Trierweiler, Chad A, MD.      Follow-up Information     Follow up with Park Howard MD.    Why:  As needed    Contact information:    [facility]  [address]   [facility] [address]  [phone]          Follow up with  Emergency Department.    Specialty:  EMERGENCY MEDICINE    Why:  If symptoms worsen    Contact information:    [address] [facility] [address] [phone]      Discharge References/Attachments     FEEDING TUBE REPLACEMENT (ENGLISH)      24 Hour Appointment Hotline       To make an appointment at any [facility], call [phone] ([phone]). If you don't have a family doctor or clinic, we will help you find one. [facility] clinics are conveniently located to serve the needs of you and your family.             Review of your medicines      Our records show that you are taking the medicines listed below. If these are incorrect, please call your family doctor or clinic.        Dose / Directions Last dose taken    acetaminophen 32 mg/mL solution   Commonly known as:  TYLENOL   Dose:  1000 mg   Quantity:  300 mL        31.25 mLs (1,000 mg) by Per G Tube route 3 times daily Give 20.31 ml (650mg)   Refills:  0        ARTIFICIAL TEARS 1.4 % ophthalmic solution   Dose:  2 drop   Generic drug:  polyvinyl alcohol        Place 2 drops into the right eye as needed for dry eyes   Refills:  0        ASPIRIN PO   Dose:  81 mg        81 mg by Per G Tube route daily   Refills:  0        CYANOCOBALAMIN PO   Dose:  1000 mcg         Take 1,000 mcg by mouth daily   Refills:  0        diclofenac 1 % Gel topical gel   Commonly known as:  VOLTAREN        Place onto the skin 2 times daily AND PRN   Refills:  0        guaiFENesin 20 mg/mL Soln solution   Commonly known as:  ROBITUSSIN   Dose:  10 mL        10 mLs by Per G Tube route every 4 hours as needed for cough   Refills:  0        SALINE NASAL SPRAY NA   Dose:  2 spray        Spray 2 sprays into both nostrils as needed   Refills:  0        sennosides 8.6 MG tablet   Commonly known as:  SENOKOT   Dose:  2 tablet        2 tablets by Per G Tube route At Bedtime   Refills:  0        SEROQUEL PO   Dose:  12.5 mg        12.5 mg by Per G Tube route daily as needed   Refills:  0        Zinc oxide 10 % Crea        Apply topically 2 times daily And PRN   Refills:  0                Orders Needing Specimen Collection     None      Pending Results     No orders found from 9/17/2018 to 9/20/2018.            Pending Culture Results     No orders found from 9/17/2018 to 9/20/2018.            Pending Results Instructions     If you had any lab results that were not finalized at the time of your Discharge, you can call the ED Lab Result RN at [phone]. You will be contacted by this team for any positive Lab results or changes in treatment. The nurses are available 7 days a week from 10A to 6:30P.  You can leave a message 24 hours per day and they will return your call.        Test Results From Your Hospital Stay               Clinical Quality Measure: Blood Pressure Screening     Your blood pressure was checked while you were in the emergency department today. The last reading we obtained was  BP: 138/73 . Please read the guidelines below about what these numbers mean and what you should do about them.  If your systolic blood pressure (the top number) is less than 120 and your diastolic blood pressure (the bottom number) is less than 80, then your blood pressure is normal. There is nothing more that you  "need to do about it.  If your systolic blood pressure (the top number) is 120-139 or your diastolic blood pressure (the bottom number) is 80-89, your blood pressure may be higher than it should be. You should have your blood pressure rechecked within a year by a primary care provider.  If your systolic blood pressure (the top number) is 140 or greater or your diastolic blood pressure (the bottom number) is 90 or greater, you may have high blood pressure. High blood pressure is treatable, but if left untreated over time it can put you at risk for heart attack, stroke, or kidney failure. You should have your blood pressure rechecked by a primary care provider within the next 4 weeks.  If your provider in the emergency department today gave you specific instructions to follow-up with your doctor or provider even sooner than that, you should follow that instruction and not wait for up to 4 weeks for your follow-up visit.        Thank you for choosing [facility]       Thank you for choosing [facility] for your care. Our goal is always to provide you with excellent care. Hearing back from our patients is one way we can continue to improve our services. Please take a few minutes to complete the written survey that you may receive in the mail after you visit with us. Thank you!        [employer] Information     [employer]mio lets you send messages to your doctor, view your test results, renew your prescriptions, schedule appointments and more. To sign up, go to [email or web address] . Click on \"Log in\" on the left side of the screen, which will take you to the Welcome page. Then click on \"Sign up Now\" on the right side of the page.     You will be asked to enter the access code listed below, as well as some personal information. Please follow the directions to create your username and password.     Your access code is: [email or web address]  Expires: 2018  1:27 AM     Your access code will  in 90 days. If you need help or a new " code, please call your [facility] clinic or [phone].        Care EveryWhere ID     This is your Care EveryWhere ID. This could be used by other organizations to access your [facility] medical records  [id number]        Equal Access to Services     AIYANA FIELD : Hong Arguello, waalejandrada luqadaha, qaybta kaalmada justina, marlin bailey. So [facility] [phone].    ATENCIÓN: Si habla español, tiene a sadler disposición servicios gratuitos de asistencia lingüística. Llame al [phone].    We comply with applicable federal civil rights laws and [place] laws. We do not discriminate on the basis of race, color, national origin, age, disability, sex, sexual orientation, or gender identity.            After Visit Summary       This is your record. Keep this with you and show to your community pharmacist(s) and doctor(s) at your next visit.

## 2018-12-07 ENCOUNTER — HOSPITAL ENCOUNTER (EMERGENCY)
Facility: CLINIC | Age: 83
Discharge: HOME | End: 2018-12-07
Attending: EMERGENCY MEDICINE | Admitting: EMERGENCY MEDICINE
Payer: COMMERCIAL

## 2018-12-07 VITALS
HEART RATE: 111 BPM | SYSTOLIC BLOOD PRESSURE: 106 MMHG | RESPIRATION RATE: 18 BRPM | TEMPERATURE: 97.6 F | OXYGEN SATURATION: 97 % | DIASTOLIC BLOOD PRESSURE: 73 MMHG

## 2018-12-07 DIAGNOSIS — T85.528A DISLODGED GASTROSTOMY TUBE: ICD-10-CM

## 2018-12-07 PROCEDURE — 49450 REPLACE G/C TUBE PERC: CPT

## 2018-12-07 PROCEDURE — 99283 EMERGENCY DEPT VISIT LOW MDM: CPT | Mod: 25

## 2018-12-07 NOTE — ED AVS SNAPSHOT
Emergency Department    [address] [facility] [address]    Phone:  [phone]    Fax:  [phone]                                       Riaz Sim   MRN: [id number]    Department:   Emergency Department   Date of Visit:  12/7/2018           Patient Information     Date Of Birth          3/11/1925        Your diagnoses for this visit were:     Dislodged gastrostomy tube (H)        You were seen by Jose Juan Bunch MD.      Follow-up Information     Schedule an appointment as soon as possible for a visit with Park Howard MD.    Why:  As needed    Contact information:    [facility]  [address]   [facility] [address]  [phone]        Discharge References/Attachments     FEEDING TUBE REPLACEMENT (ENGLISH)      24 Hour Appointment Hotline       To make an appointment at any [facility], call [phone] ([phone]). If you don't have a family doctor or clinic, we will help you find one. [facility] are conveniently located to serve the needs of you and your family.             Review of your medicines      Our records show that you are taking the medicines listed below. If these are incorrect, please call your family doctor or clinic.        Dose / Directions Last dose taken    acetaminophen 32 mg/mL liquid   Commonly known as:  TYLENOL   Dose:  1000 mg   Quantity:  300 mL        31.25 mLs (1,000 mg) by Per G Tube route 3 times daily Give 20.31 ml (650mg)   Refills:  0        ARTIFICIAL TEARS 1.4 % ophthalmic solution   Dose:  2 drop   Generic drug:  polyvinyl alcohol        Place 2 drops into the right eye as needed for dry eyes   Refills:  0        ASPIRIN PO   Dose:  81 mg        81 mg by Per G Tube route daily   Refills:  0        CYANOCOBALAMIN PO   Dose:  1000 mcg        Take 1,000 mcg by mouth daily   Refills:  0        diclofenac 1 % topical gel   Commonly known as:  VOLTAREN        Place onto the skin 2 times daily AND PRN    Refills:  0        guaiFENesin 20 mg/mL Soln solution   Commonly known as:  ROBITUSSIN   Dose:  10 mL        10 mLs by Per G Tube route every 4 hours as needed for cough   Refills:  0        SALINE NASAL SPRAY NA   Dose:  2 spray        Spray 2 sprays into both nostrils as needed   Refills:  0        sennosides 8.6 MG tablet   Commonly known as:  SENOKOT   Dose:  2 tablet        2 tablets by Per G Tube route At Bedtime   Refills:  0        SEROQUEL PO   Dose:  12.5 mg        12.5 mg by Per G Tube route daily as needed   Refills:  0        Zinc oxide 10 % Crea        Apply topically 2 times daily And PRN   Refills:  0                Orders Needing Specimen Collection     None      Pending Results     No orders found from 12/5/2018 to 12/8/2018.            Pending Culture Results     No orders found from 12/5/2018 to 12/8/2018.            Pending Results Instructions     If you had any lab results that were not finalized at the time of your Discharge, you can call the ED Lab Result RN at [phone]. You will be contacted by this team for any positive Lab results or changes in treatment. The nurses are available 7 days a week from 10A to 6:30P.  You can leave a message 24 hours per day and they will return your call.        Test Results From Your Hospital Stay               Clinical Quality Measure: Blood Pressure Screening     Your blood pressure was checked while you were in the emergency department today. The last reading we obtained was  BP: 100/66 . Please read the guidelines below about what these numbers mean and what you should do about them.  If your systolic blood pressure (the top number) is less than 120 and your diastolic blood pressure (the bottom number) is less than 80, then your blood pressure is normal. There is nothing more that you need to do about it.  If your systolic blood pressure (the top number) is 120-139 or your diastolic blood pressure (the bottom number) is 80-89, your blood pressure  "may be higher than it should be. You should have your blood pressure rechecked within a year by a primary care provider.  If your systolic blood pressure (the top number) is 140 or greater or your diastolic blood pressure (the bottom number) is 90 or greater, you may have high blood pressure. High blood pressure is treatable, but if left untreated over time it can put you at risk for heart attack, stroke, or kidney failure. You should have your blood pressure rechecked by a primary care provider within the next 4 weeks.  If your provider in the emergency department today gave you specific instructions to follow-up with your doctor or provider even sooner than that, you should follow that instruction and not wait for up to 4 weeks for your follow-up visit.        Thank you for choosing [facility]       Thank you for choosing [facility] for your care. Our goal is always to provide you with excellent care. Hearing back from our patients is one way we can continue to improve our services. Please take a few minutes to complete the written survey that you may receive in the mail after you visit with us. Thank you!        [employer] Information     [employer] lets you send messages to your doctor, view your test results, renew your prescriptions, schedule appointments and more. To sign up, go to [email or web address] . Click on \"Log in\" on the left side of the screen, which will take you to the Welcome page. Then click on \"Sign up Now\" on the right side of the page.     You will be asked to enter the access code listed below, as well as some personal information. Please follow the directions to create your username and password.     Your access code is: [email or web address]  Expires: 2018 12:27 AM     Your access code will  in 90 days. If you need help or a new code, please call your [facility] clinic or [phone].        Care EveryWhere ID     This is your Care EveryWhere ID. This could be used by other organizations to " access your [facility] medical records  [id number]        Equal Access to Services     AIYANA FIELD : Hadii dawood Arguello, jurgen dang, marlin santana. So [facility] [phone].    ATENCIÓN: Si habla español, tiene a sadler disposición servicios gratuitos de asistencia lingüística. Llame al [phone].    We comply with applicable federal civil rights laws and [place] laws. We do not discriminate on the basis of race, color, national origin, age, disability, sex, sexual orientation, or gender identity.            After Visit Summary       This is your record. Keep this with you and show to your community pharmacist(s) and doctor(s) at your next visit.

## 2018-12-07 NOTE — ED AVS SNAPSHOT
Emergency Department    [address] [place] [address]    Phone:  [phone]    Fax:  [phone]                                       Riaz Sim   MRN: [id number]    Department:   Emergency Department   Date of Visit:  12/7/2018           After Visit Summary Signature Page     I have received my discharge instructions, and my questions have been answered. I have discussed any challenges I see with this plan with the nurse or doctor.    ..........................................................................................................................................  Patient/Patient Representative Signature      ..........................................................................................................................................  Patient Representative Print Name and Relationship to Patient    ..................................................               ................................................  Date                                   Time    ..........................................................................................................................................  Reviewed by Signature/Title    ...................................................              ..............................................  Date                                               Time          22EPIC Rev 08/18

## 2018-12-08 NOTE — ED PROVIDER NOTES
History     Chief Complaint:  G-tube problem    HPI   HPI limited, patient is non-verbal due to stroke.   Riaz Sim is a 89 year old female with a medical history of dementia, hemiplegia due to cerebrovascular disease, and seizure disorder who presents with a G-tube problem. The patient arrived in the emergency department via EMS. The patient currently lives at her care facility and she reportedly pulled out her G-tube today. Ambulance was then called to bring the patient to the emergency department for further evaluation and treatment. The patient had also been seen here in the emergency department on 9/17 and 9/19 for GI tube dislodgement.     Allergies:  No known drug allergies     Medications:    Aspirin  Cyanobalamin  Polyvinyl alcohol  Quetiapine fumarate    Past Medical History:    Cerebral embolism with cerebral infarction  Dementia  Diabetes  Hypertension  G-tube feedings  Hemiplegia  Insomnia  Osteoporosis  Seizure disorder    Past Surgical History:    Left wrist surgery     Family History:    History reviewed. No pertinent family history.      Social History:  Smoking status: Never smoker  Alcohol use: No   Marital Status:   [5]  PCP: Park Howard      Review of Systems   Unable to perform ROS: Patient nonverbal     Physical Exam   Patient Vitals for the past 24 hrs:   BP Temp Temp src Pulse Resp SpO2   12/07/18 2109 100/66 97.6  F (36.4  C) Temporal 111 18 98 %      Physical Exam  Constitutional: Elderly  female supine. Contracted. Nonverbal.   Abdominal: Gastrotomy site present, left upper abdomen. Small amount of blood at site, no active bleeding. Soft and non-tender.   Neurological: Non-verbal. Opens eyes to palpation     Emergency Department Course   Procedures: Gastrostomy tube replacement  12 Vietnamese gastrostomy tube was obtained from the OR;  I was able to insert the tube with gentle pressure and then filled the balloon with  3 ccs of sterile saline. The port was  aspirated and stomach contents were obtained. The tube was secured.     Emergency Department Course:  Past medical records, nursing notes, and vitals reviewed.  2105: I performed an exam of the patient and obtained history, as documented above.    2207: I performed a gastrostomy tube replacement, procedure note as detailed above.   I rechecked the patient. Findings and plan explained to the Patient. Patient discharged home with instructions regarding supportive care, medications, and reasons to return. The importance of close follow-up was reviewed.      Impression & Plan    Medical Decision Making:  Riaz Sim is a 89 year old female who presents to the emergency department because of gastrostomy tube was actually pulled out. Patient has had a stroke and dementia. She's nonverbal. She has a small gastrostomy site with minimal bloody discharge around it, no active discharge and a soft abdomen. Patient will be discharged back to nursing home and follow-up as needed.    Diagnosis:    ICD-10-CM   1. Dislodged gastrostomy tube (H) Z43.1       Disposition:  discharged to home    Juana Bonilla  12/7/2018    EMERGENCY DEPARTMENT  I, Juana Bonilla, am serving as a scribe at 9:05 PM on 12/7/2018 to document services personally performed by Jose Juan Bunch MD based on my observations and the provider's statements to me.       Jose Juan Bunch MD  12/07/18 8102

## 2018-12-08 NOTE — ED NOTES
Bed: ED21  Expected date:   Expected time:   Means of arrival:   Comments:  [place] 2-89F [place]

## 2018-12-08 NOTE — ED NOTES
Assisted Alivia REYES for replacement of 12f G Tube.  Confirmation via gastric contents.  Tolerated procedure well.